# Patient Record
Sex: MALE | Race: WHITE | Employment: PART TIME | URBAN - METROPOLITAN AREA
[De-identification: names, ages, dates, MRNs, and addresses within clinical notes are randomized per-mention and may not be internally consistent; named-entity substitution may affect disease eponyms.]

---

## 2022-07-07 ENCOUNTER — HOSPITAL ENCOUNTER (EMERGENCY)
Age: 77
Discharge: HOME | End: 2022-07-07
Payer: MEDICARE

## 2022-07-07 VITALS
SYSTOLIC BLOOD PRESSURE: 140 MMHG | TEMPERATURE: 98.3 F | DIASTOLIC BLOOD PRESSURE: 68 MMHG | RESPIRATION RATE: 18 BRPM | OXYGEN SATURATION: 97 % | HEART RATE: 69 BPM

## 2022-07-07 DIAGNOSIS — M54.2 NECK PAIN: Primary | ICD-10-CM

## 2022-07-07 PROCEDURE — 6360000002 HC RX W HCPCS: Performed by: PHYSICIAN ASSISTANT

## 2022-07-07 PROCEDURE — 99284 EMERGENCY DEPT VISIT MOD MDM: CPT

## 2022-07-07 PROCEDURE — 96372 THER/PROPH/DIAG INJ SC/IM: CPT

## 2022-07-07 RX ORDER — METHOCARBAMOL 500 MG/1
500 TABLET, FILM COATED ORAL 4 TIMES DAILY
Qty: 40 TABLET | Refills: 0 | Status: SHIPPED | OUTPATIENT
Start: 2022-07-07 | End: 2022-07-17

## 2022-07-07 RX ORDER — LIDOCAINE 50 MG/G
1 PATCH TOPICAL DAILY
Qty: 10 PATCH | Refills: 0 | Status: SHIPPED | OUTPATIENT
Start: 2022-07-07 | End: 2022-07-17

## 2022-07-07 RX ORDER — KETOROLAC TROMETHAMINE 30 MG/ML
15 INJECTION, SOLUTION INTRAMUSCULAR; INTRAVENOUS ONCE
Status: COMPLETED | OUTPATIENT
Start: 2022-07-07 | End: 2022-07-07

## 2022-07-07 RX ADMIN — KETOROLAC TROMETHAMINE 15 MG: 30 INJECTION, SOLUTION INTRAMUSCULAR at 15:34

## 2022-07-07 ASSESSMENT — PAIN SCALES - GENERAL: PAINLEVEL_OUTOF10: 8

## 2022-07-07 ASSESSMENT — PAIN - FUNCTIONAL ASSESSMENT: PAIN_FUNCTIONAL_ASSESSMENT: 0-10

## 2022-07-07 ASSESSMENT — PAIN DESCRIPTION - LOCATION: LOCATION: NECK

## 2022-07-07 NOTE — ED PROVIDER NOTES
[facility] Emergency Department    CHIEF COMPLAINT  Shoulder Pain (began 2 nights ago, unable to sleep pain starts in neck left side radiates down to left arm. ) and Neck Pain      SHARED SERVICE VISIT  Evaluated by LIANG. My supervising physician was available for consultation. HISTORY OF PRESENT ILLNESS  Zelalem Aaron is a 65 y.o. male who presents to the ED complaining of left-sided neck pain. Patient states he been experiencing left-sided neck pain for 2 days. Denies any trauma or mechanism of injury. Patient states that he is experiencing pain with any type of head motion. Patient states pain begins in his neck and radiates to his left shoulder. Patient denies any numbness or tingling radiating down the left arm. Patient denies any weakness of the left arm. Patient denies any chest pain, shortness of breath, or dyspnea on exertion. Patient denies any headaches, dizziness, or loss conscious. Patient denies any nausea, vomiting, diarrhea, or abdominal pain. Patient denies any urinary symptoms. Patient denies any recent travel or sick contacts. No other complaints, modifying factors or associated symptoms. Nursing notes reviewed. History reviewed. No pertinent past medical history. Past Surgical History:   Procedure Laterality Date    BACK SURGERY       History reviewed. No pertinent family history.   Social History     Socioeconomic History    Marital status:      Spouse name: Not on file    Number of children: Not on file    Years of education: Not on file    Highest education level: Not on file   Occupational History    Not on file   Tobacco Use    Smoking status: Never Smoker    Smokeless tobacco: Not on file   Substance and Sexual Activity    Alcohol use: Never    Drug use: Never    Sexual activity: Not on file   Other Topics Concern    Not on file   Social History Narrative    Not on file     Social Determinants of Health     Financial Resource Strain:     Difficulty of Paying Living Expenses: Not on file   Food Insecurity:     Worried About Running Out of Food in the Last Year: Not on file    Jason of Food in the Last Year: Not on file   Transportation Needs:     Lack of Transportation (Medical): Not on file    Lack of Transportation (Non-Medical): Not on file   Physical Activity:     Days of Exercise per Week: Not on file    Minutes of Exercise per Session: Not on file   Stress:     Feeling of Stress : Not on file   Social Connections:     Frequency of Communication with Friends and Family: Not on file    Frequency of Social Gatherings with Friends and Family: Not on file    Attends Advent Services: Not on file    Active Member of Clubs or Organizations: Not on file    Attends Club or Organization Meetings: Not on file    Marital Status: Not on file   Intimate Partner Violence:     Fear of Current or Ex-Partner: Not on file    Emotionally Abused: Not on file    Physically Abused: Not on file    Sexually Abused: Not on file   Housing Stability:     Unable to Pay for Housing in the Last Year: Not on file    Number of [place] in the Last Year: Not on file    Unstable Housing in the Last Year: Not on file     Current Facility-Administered Medications   Medication Dose Route Frequency Provider Last Rate Last Admin    ketorolac (TORADOL) injection 15 mg  15 mg IntraMUSCular Once Gemma Dunn PA-C         No current outpatient medications on file. No Known Allergies    REVIEW OF SYSTEMS  10 systems reviewed, pertinent positives per HPI otherwise noted to be negative    PHYSICAL EXAM  BP (!) 140/68   Pulse 69   Temp 98.3 °F (36.8 °C) (Oral)   Resp 18   SpO2 97%   GENERAL APPEARANCE: Awake and alert. Cooperative. No acute distress. Slight discomfort  HEAD: Normocephalic. Atraumatic. EYES: PERRL. EOM's grossly intact.    ENT: Mucous membranes are pink and moist  NECK: Tenderness palpation along the sternocleidomastoid muscle left-sided. Pain with head motions to the left. No deformities, crepitus, or step-off noted on palpation. No erythema, edema, or ecchymosis. Full range of motion with pain. Supple. HEART: RRR. No murmurs. LUNGS: Respirations unlabored. CTAB. Good air exchange. Speaking comfortably in full sentences. ABDOMEN: Soft. Non-distended. Non-tender. No guarding or rebound. No masses. No organomegaly. EXTREMITIES: No peripheral edema. Moves all extremities equally. All extremities neurovascularly intact. SKIN: Warm and dry. No acute rashes. NEUROLOGICAL: Alert and oriented. CN's 2-12 intact. No gross facial drooping. Strength 5/5, sensation intact. PSYCHIATRIC: Normal mood and affect. RADIOLOGY  No results found. ED COURSE  76-year-old male presents to the ED complaining of left-sided neck pain for the past 2 days. He is experiencing pain with head motions to the left side. Pain does radiate down to his left shoulder, however he denies any numbness or tingling radiating down the arm. Is palpation along the sternocleidomastoid muscle on the left side as well as the trapezius muscle. There is no edema, ecchymosis, erythema. No palpable deformities, crepitus, or step-off. Suspect sternocleidomastoid muscle strain. Patient received Toradol for pain, with good relief. Triage vitals /68, pulse of 69, respirations 18, temperature 98.3 °F, SPO2 97% on room air. Risk management discussed and shared decision making had with patient and/or surrogate. All questions were answered. Patient will follow up with primary care provider for further evaluation/treatment. All questions answered. Patient will return to ED for new/worsening symptoms. Patient was sent home with a prescription for lidocaine patches and Robaxin    CRITICAL CARE TIME  0 minutes of critical care time spent not including separately billable procedures.     MDM  No results found for this visit on 07/07/22. I estimate there is LOW risk for CAUDA EQUINA or CENTRAL CORD SYNDROME, EPIDURAL MASS LESION, MENINGITIS, SPINAL STENOSIS, OR HERNIATED DISK CAUSING SEVERE STENOSIS, thus I consider the discharge disposition reasonable. Ifeoma Morneo and I have discussed the diagnosis and risks, and we agree with discharging home to follow-up with their primary doctor. We also discussed returning to the Emergency Department immediately if new or worsening symptoms occur. We have discussed the symptoms which are most concerning (e.g., saddle anesthesia, urinary or bowel incontinence or retention, changing or worsening pain) that necessitate immediate return. FInal Impression    1. Neck pain        Blood pressure (!) 140/68, pulse 69, temperature 98.3 °F (36.8 °C), temperature source Oral, resp. rate 18, SpO2 97 %. DISPOSITION  Patient was discharged to home in good condition.          Delmi Slaughter PA-C  07/11/22 1950

## 2023-01-17 ENCOUNTER — APPOINTMENT (OUTPATIENT)
Dept: GENERAL RADIOLOGY | Age: 78
End: 2023-01-17
Payer: COMMERCIAL

## 2023-01-17 ENCOUNTER — HOSPITAL ENCOUNTER (EMERGENCY)
Age: 78
Discharge: HOME | End: 2023-01-17
Attending: STUDENT IN AN ORGANIZED HEALTH CARE EDUCATION/TRAINING PROGRAM
Payer: COMMERCIAL

## 2023-01-17 ENCOUNTER — APPOINTMENT (OUTPATIENT)
Dept: CT IMAGING | Age: 78
End: 2023-01-17
Payer: COMMERCIAL

## 2023-01-17 VITALS
DIASTOLIC BLOOD PRESSURE: 84 MMHG | SYSTOLIC BLOOD PRESSURE: 160 MMHG | HEART RATE: 53 BPM | TEMPERATURE: 98 F | RESPIRATION RATE: 16 BRPM | OXYGEN SATURATION: 97 %

## 2023-01-17 DIAGNOSIS — R31.9 HEMATURIA, UNSPECIFIED TYPE: ICD-10-CM
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
## 2023-01-17 DIAGNOSIS — S22.20XA CLOSED FRACTURE OF STERNUM, UNSPECIFIED PORTION OF STERNUM, INITIAL ENCOUNTER: Primary | ICD-10-CM

## 2023-01-17 LAB
A/G RATIO: 1.8 (ref 1.1–2.2)
ALBUMIN SERPL-MCNC: 4.4 G/DL (ref 3.4–5)
ALP BLD-CCNC: 117 U/L (ref 40–129)
ALT SERPL-CCNC: 18 U/L (ref 10–40)
ANION GAP SERPL CALCULATED.3IONS-SCNC: 11 MMOL/L (ref 3–16)
AST SERPL-CCNC: 22 U/L (ref 15–37)
BASOPHILS ABSOLUTE: 0 K/UL (ref 0–0.2)
BASOPHILS RELATIVE PERCENT: 0.9 %
BILIRUB SERPL-MCNC: 0.3 MG/DL (ref 0–1)
BILIRUBIN URINE: NEGATIVE
BLOOD, URINE: ABNORMAL
BUN BLDV-MCNC: 31 MG/DL (ref 7–20)
CALCIUM SERPL-MCNC: 9.3 MG/DL (ref 8.3–10.6)
CHLORIDE BLD-SCNC: 107 MMOL/L (ref 99–110)
CLARITY: ABNORMAL
CO2: 25 MMOL/L (ref 21–32)
COLOR: ABNORMAL
CREAT SERPL-MCNC: 1 MG/DL (ref 0.8–1.3)
EOSINOPHILS ABSOLUTE: 0.1 K/UL (ref 0–0.6)
EOSINOPHILS RELATIVE PERCENT: 1.2 %
GFR SERPL CREATININE-BSD FRML MDRD: >60 ML/MIN/{1.73_M2}
GLUCOSE BLD-MCNC: 128 MG/DL (ref 70–99)
GLUCOSE URINE: NEGATIVE MG/DL
HCT VFR BLD CALC: 34 % (ref 40.5–52.5)
HEMOGLOBIN: 11.3 G/DL (ref 13.5–17.5)
KETONES, URINE: NEGATIVE MG/DL
LACTIC ACID: 1.7 MMOL/L (ref 0.4–2)
LEUKOCYTE ESTERASE, URINE: NEGATIVE
LIPASE: 29 U/L (ref 13–60)
LYMPHOCYTES ABSOLUTE: 1.1 K/UL (ref 1–5.1)
LYMPHOCYTES RELATIVE PERCENT: 21.5 %
MCH RBC QN AUTO: 30.8 PG (ref 26–34)
MCHC RBC AUTO-ENTMCNC: 33.2 G/DL (ref 31–36)
MCV RBC AUTO: 92.8 FL (ref 80–100)
MICROSCOPIC EXAMINATION: YES
MONOCYTES ABSOLUTE: 0.4 K/UL (ref 0–1.3)
MONOCYTES RELATIVE PERCENT: 7.4 %
NEUTROPHILS ABSOLUTE: 3.6 K/UL (ref 1.7–7.7)
NEUTROPHILS RELATIVE PERCENT: 69 %
NITRITE, URINE: NEGATIVE
PDW BLD-RTO: 14.1 % (ref 12.4–15.4)
PH UA: 5.5 (ref 5–8)
PLATELET # BLD: 274 K/UL (ref 135–450)
PMV BLD AUTO: 7 FL (ref 5–10.5)
POTASSIUM SERPL-SCNC: 3.9 MMOL/L (ref 3.5–5.1)
PROTEIN UA: ABNORMAL MG/DL
RBC # BLD: 3.67 M/UL (ref 4.2–5.9)
RBC UA: >100 /HPF (ref 0–4)
SODIUM BLD-SCNC: 143 MMOL/L (ref 136–145)
SPECIFIC GRAVITY UA: 1.02 (ref 1–1.03)
TOTAL CK: 99 U/L (ref 39–308)
TOTAL PROTEIN: 6.8 G/DL (ref 6.4–8.2)
TROPONIN: <0.01 NG/ML
URINE REFLEX TO CULTURE: ABNORMAL
URINE TYPE: ABNORMAL
UROBILINOGEN, URINE: 0.2 E.U./DL
WBC # BLD: 5.1 K/UL (ref 4–11)
WBC UA: ABNORMAL /HPF (ref 0–5)

## 2023-01-17 PROCEDURE — 80053 COMPREHEN METABOLIC PANEL: CPT

## 2023-01-17 PROCEDURE — 73610 X-RAY EXAM OF ANKLE: CPT

## 2023-01-17 PROCEDURE — 73590 X-RAY EXAM OF LOWER LEG: CPT

## 2023-01-17 PROCEDURE — 82550 ASSAY OF CK (CPK): CPT

## 2023-01-17 PROCEDURE — 84484 ASSAY OF TROPONIN QUANT: CPT

## 2023-01-17 PROCEDURE — 81001 URINALYSIS AUTO W/SCOPE: CPT

## 2023-01-17 PROCEDURE — 83605 ASSAY OF LACTIC ACID: CPT

## 2023-01-17 PROCEDURE — 83690 ASSAY OF LIPASE: CPT

## 2023-01-17 PROCEDURE — 93005 ELECTROCARDIOGRAM TRACING: CPT | Performed by: STUDENT IN AN ORGANIZED HEALTH CARE EDUCATION/TRAINING PROGRAM

## 2023-01-17 PROCEDURE — 74177 CT ABD & PELVIS W/CONTRAST: CPT

## 2023-01-17 PROCEDURE — 71260 CT THORAX DX C+: CPT | Performed by: STUDENT IN AN ORGANIZED HEALTH CARE EDUCATION/TRAINING PROGRAM

## 2023-01-17 PROCEDURE — 73080 X-RAY EXAM OF ELBOW: CPT

## 2023-01-17 PROCEDURE — 6360000004 HC RX CONTRAST MEDICATION: Performed by: STUDENT IN AN ORGANIZED HEALTH CARE EDUCATION/TRAINING PROGRAM

## 2023-01-17 PROCEDURE — 85025 COMPLETE CBC W/AUTO DIFF WBC: CPT

## 2023-01-17 PROCEDURE — 6370000000 HC RX 637 (ALT 250 FOR IP): Performed by: STUDENT IN AN ORGANIZED HEALTH CARE EDUCATION/TRAINING PROGRAM

## 2023-01-17 PROCEDURE — 99285 EMERGENCY DEPT VISIT HI MDM: CPT

## 2023-01-17 RX ORDER — DIPHENHYDRAMINE HCL 25 MG
2 CAPSULE ORAL
COMMUNITY

## 2023-01-17 RX ORDER — ACETAMINOPHEN 325 MG/1
650 TABLET ORAL ONCE
Status: COMPLETED | OUTPATIENT
Start: 2023-01-17 | End: 2023-01-17

## 2023-01-17 RX ORDER — TAMSULOSIN HYDROCHLORIDE 0.4 MG/1
CAPSULE ORAL
COMMUNITY
Start: 2021-08-20

## 2023-01-17 RX ORDER — CETIRIZINE HYDROCHLORIDE, PSEUDOEPHEDRINE HYDROCHLORIDE 5; 120 MG/1; MG/1
1 TABLET, FILM COATED, EXTENDED RELEASE ORAL
COMMUNITY

## 2023-01-17 RX ORDER — SENNOSIDES 8.6 MG
650 CAPSULE ORAL
COMMUNITY

## 2023-01-17 RX ORDER — AZELASTINE 1 MG/ML
SPRAY, METERED NASAL
COMMUNITY
Start: 2022-04-13

## 2023-01-17 RX ORDER — CELECOXIB 200 MG/1
CAPSULE ORAL
COMMUNITY

## 2023-01-17 RX ORDER — ATORVASTATIN CALCIUM 20 MG/1
20 TABLET, FILM COATED ORAL DAILY
COMMUNITY
Start: 2022-07-06 | End: 2023-04-01

## 2023-01-17 RX ORDER — CEPHALEXIN 500 MG/1
500 CAPSULE ORAL 4 TIMES DAILY
Qty: 28 CAPSULE | Refills: 0 | Status: SHIPPED | OUTPATIENT
Start: 2023-01-17 | End: 2023-01-24

## 2023-01-17 RX ORDER — CEPHALEXIN 500 MG/1
CAPSULE ORAL
COMMUNITY
Start: 2022-06-23 | End: 2023-01-17

## 2023-01-17 RX ORDER — METHOCARBAMOL 500 MG/1
500 TABLET, FILM COATED ORAL 4 TIMES DAILY
COMMUNITY
Start: 2022-07-07

## 2023-01-17 RX ORDER — METHOCARBAMOL 750 MG/1
1500 TABLET, FILM COATED ORAL ONCE
Status: COMPLETED | OUTPATIENT
Start: 2023-01-17 | End: 2023-01-17

## 2023-01-17 RX ORDER — PREDNISONE 20 MG/1
TABLET ORAL
COMMUNITY
Start: 2022-06-23

## 2023-01-17 RX ADMIN — METHOCARBAMOL TABLETS 1500 MG: 750 TABLET, COATED ORAL at 15:35

## 2023-01-17 RX ADMIN — IOPAMIDOL 75 ML: 755 INJECTION, SOLUTION INTRAVENOUS at 16:35

## 2023-01-17 RX ADMIN — ACETAMINOPHEN 325MG 650 MG: 325 TABLET ORAL at 15:35

## 2023-01-17 ASSESSMENT — PAIN SCALES - GENERAL
PAINLEVEL_OUTOF10: 6
PAINLEVEL_OUTOF10: 2

## 2023-01-17 NOTE — DISCHARGE INSTRUCTIONS
Follow-up with orthopedics as well as urology.:  Set up follow-up appointment soon as able for reevaluation. Begin taking analgesia given to you by primary physician. I would like you to get a repeat CBC and BMP your primary doctor within the next 2 to 3 days return to emergency department for worsening uncontrolled pain, shortness of breath, lightheadedness or dizziness or any new change or worsening symptoms were always here for reevaluation. Your ankle x-ray was concerning for a medial malleolus avulsion fracture we will give you a boot with follow-up with orthopedics. For your blood in your urine return to emergency department for worsening abdominal pain, inability to urinate, fevers or chills, worsening blood in urine or any new changing or worsening symptoms and follow-up with urology as soon as able.
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
## 2023-01-18 LAB
EKG ATRIAL RATE: 65 BPM
EKG DIAGNOSIS: NORMAL
EKG P AXIS: 59 DEGREES
EKG P-R INTERVAL: 146 MS
EKG Q-T INTERVAL: 378 MS
EKG QRS DURATION: 82 MS
EKG QTC CALCULATION (BAZETT): 393 MS
EKG R AXIS: 57 DEGREES
EKG T AXIS: 46 DEGREES
EKG VENTRICULAR RATE: 65 BPM

## 2024-01-13 ENCOUNTER — APPOINTMENT (OUTPATIENT)
Dept: CT IMAGING | Age: 79
DRG: 177 | End: 2024-01-13
Payer: MEDICARE

## 2024-01-13 ENCOUNTER — HOSPITAL ENCOUNTER (INPATIENT)
Age: 79
LOS: 9 days | Discharge: HOME HEALTH | DRG: 177 | End: 2024-01-22
Attending: EMERGENCY MEDICINE | Admitting: STUDENT IN AN ORGANIZED HEALTH CARE EDUCATION/TRAINING PROGRAM
Payer: MEDICARE

## 2024-01-13 ENCOUNTER — APPOINTMENT (OUTPATIENT)
Dept: GENERAL RADIOLOGY | Age: 79
DRG: 177 | End: 2024-01-13
Payer: MEDICARE

## 2024-01-13 DIAGNOSIS — G93.40 ENCEPHALOPATHY: ICD-10-CM

## 2024-01-13 DIAGNOSIS — R41.0 CONFUSION: Primary | ICD-10-CM

## 2024-01-13 PROBLEM — R41.82 ALTERED MENTAL STATE: Status: ACTIVE | Noted: 2024-01-13

## 2024-01-13 LAB
ALBUMIN SERPL-MCNC: 4.3 G/DL (ref 3.4–5)
ALBUMIN/GLOB SERPL: 1.7 {RATIO} (ref 1.1–2.2)
ALP SERPL-CCNC: 75 U/L (ref 40–129)
ALT SERPL-CCNC: 16 U/L (ref 10–40)
ANION GAP SERPL CALCULATED.3IONS-SCNC: 9 MMOL/L (ref 3–16)
AST SERPL-CCNC: 21 U/L (ref 15–37)
BACTERIA URNS QL MICRO: ABNORMAL /HPF
BASOPHILS # BLD: 0 K/UL (ref 0–0.2)
BASOPHILS NFR BLD: 0.8 %
BILIRUB SERPL-MCNC: 0.4 MG/DL (ref 0–1)
BILIRUB UR QL STRIP.AUTO: NEGATIVE
BUN SERPL-MCNC: 25 MG/DL (ref 7–20)
CALCIUM SERPL-MCNC: 9.6 MG/DL (ref 8.3–10.6)
CHLORIDE SERPL-SCNC: 101 MMOL/L (ref 99–110)
CLARITY UR: CLEAR
CO2 SERPL-SCNC: 27 MMOL/L (ref 21–32)
COLOR UR: YELLOW
CREAT SERPL-MCNC: 0.9 MG/DL (ref 0.8–1.3)
DEPRECATED RDW RBC AUTO: 13.2 % (ref 12.4–15.4)
EKG ATRIAL RATE: 70 BPM
EKG DIAGNOSIS: NORMAL
EKG P AXIS: 27 DEGREES
EKG P-R INTERVAL: 132 MS
EKG Q-T INTERVAL: 386 MS
EKG QRS DURATION: 86 MS
EKG QTC CALCULATION (BAZETT): 416 MS
EKG R AXIS: 0 DEGREES
EKG T AXIS: 52 DEGREES
EKG VENTRICULAR RATE: 70 BPM
EOSINOPHIL # BLD: 0 K/UL (ref 0–0.6)
EOSINOPHIL NFR BLD: 0.8 %
EPI CELLS #/AREA URNS HPF: ABNORMAL /HPF (ref 0–5)
GFR SERPLBLD CREATININE-BSD FMLA CKD-EPI: >60 ML/MIN/{1.73_M2}
GLUCOSE SERPL-MCNC: 92 MG/DL (ref 70–99)
GLUCOSE UR STRIP.AUTO-MCNC: NEGATIVE MG/DL
HCT VFR BLD AUTO: 40.3 % (ref 40.5–52.5)
HGB BLD-MCNC: 13.2 G/DL (ref 13.5–17.5)
HGB UR QL STRIP.AUTO: ABNORMAL
KETONES UR STRIP.AUTO-MCNC: NEGATIVE MG/DL
LEUKOCYTE ESTERASE UR QL STRIP.AUTO: NEGATIVE
LYMPHOCYTES # BLD: 0.6 K/UL (ref 1–5.1)
LYMPHOCYTES NFR BLD: 15.8 %
MCH RBC QN AUTO: 31.2 PG (ref 26–34)
MCHC RBC AUTO-ENTMCNC: 32.9 G/DL (ref 31–36)
MCV RBC AUTO: 95.1 FL (ref 80–100)
MONOCYTES # BLD: 0.5 K/UL (ref 0–1.3)
MONOCYTES NFR BLD: 14 %
MUCOUS THREADS #/AREA URNS LPF: ABNORMAL /LPF
NEUTROPHILS # BLD: 2.4 K/UL (ref 1.7–7.7)
NEUTROPHILS NFR BLD: 68.6 %
NITRITE UR QL STRIP.AUTO: NEGATIVE
PH UR STRIP.AUTO: 6 [PH] (ref 5–8)
PLATELET # BLD AUTO: 214 K/UL (ref 135–450)
PMV BLD AUTO: 7.2 FL (ref 5–10.5)
POTASSIUM SERPL-SCNC: 3.9 MMOL/L (ref 3.5–5.1)
PROT SERPL-MCNC: 6.8 G/DL (ref 6.4–8.2)
PROT UR STRIP.AUTO-MCNC: NEGATIVE MG/DL
RBC # BLD AUTO: 4.24 M/UL (ref 4.2–5.9)
RBC #/AREA URNS HPF: ABNORMAL /HPF (ref 0–4)
SODIUM SERPL-SCNC: 137 MMOL/L (ref 136–145)
SP GR UR STRIP.AUTO: >=1.03 (ref 1–1.03)
TROPONIN, HIGH SENSITIVITY: 23 NG/L (ref 0–22)
TROPONIN, HIGH SENSITIVITY: 24 NG/L (ref 0–22)
UA COMPLETE W REFLEX CULTURE PNL UR: ABNORMAL
UA DIPSTICK W REFLEX MICRO PNL UR: YES
URN SPEC COLLECT METH UR: ABNORMAL
UROBILINOGEN UR STRIP-ACNC: 0.2 E.U./DL
WBC # BLD AUTO: 3.5 K/UL (ref 4–11)
WBC #/AREA URNS HPF: ABNORMAL /HPF (ref 0–5)
YEAST URNS QL MICRO: PRESENT /HPF

## 2024-01-13 PROCEDURE — 93005 ELECTROCARDIOGRAM TRACING: CPT | Performed by: PHYSICIAN ASSISTANT

## 2024-01-13 PROCEDURE — 70450 CT HEAD/BRAIN W/O DYE: CPT

## 2024-01-13 PROCEDURE — 1200000000 HC SEMI PRIVATE

## 2024-01-13 PROCEDURE — 85025 COMPLETE CBC W/AUTO DIFF WBC: CPT

## 2024-01-13 PROCEDURE — 80053 COMPREHEN METABOLIC PANEL: CPT

## 2024-01-13 PROCEDURE — 71045 X-RAY EXAM CHEST 1 VIEW: CPT

## 2024-01-13 PROCEDURE — 6370000000 HC RX 637 (ALT 250 FOR IP): Performed by: NURSE PRACTITIONER

## 2024-01-13 PROCEDURE — 81001 URINALYSIS AUTO W/SCOPE: CPT

## 2024-01-13 PROCEDURE — 72125 CT NECK SPINE W/O DYE: CPT

## 2024-01-13 PROCEDURE — 84443 ASSAY THYROID STIM HORMONE: CPT

## 2024-01-13 PROCEDURE — 84484 ASSAY OF TROPONIN QUANT: CPT

## 2024-01-13 PROCEDURE — 2580000003 HC RX 258: Performed by: NURSE PRACTITIONER

## 2024-01-13 PROCEDURE — 93010 ELECTROCARDIOGRAM REPORT: CPT | Performed by: INTERNAL MEDICINE

## 2024-01-13 PROCEDURE — 6370000000 HC RX 637 (ALT 250 FOR IP): Performed by: PHYSICIAN ASSISTANT

## 2024-01-13 PROCEDURE — 99285 EMERGENCY DEPT VISIT HI MDM: CPT

## 2024-01-13 RX ORDER — SODIUM CHLORIDE 0.9 % (FLUSH) 0.9 %
5-40 SYRINGE (ML) INJECTION PRN
Status: DISCONTINUED | OUTPATIENT
Start: 2024-01-13 | End: 2024-01-22 | Stop reason: HOSPADM

## 2024-01-13 RX ORDER — ACETAMINOPHEN 325 MG/1
650 TABLET ORAL EVERY 6 HOURS PRN
Status: DISCONTINUED | OUTPATIENT
Start: 2024-01-13 | End: 2024-01-22 | Stop reason: HOSPADM

## 2024-01-13 RX ORDER — POTASSIUM CHLORIDE 7.45 MG/ML
10 INJECTION INTRAVENOUS PRN
Status: DISCONTINUED | OUTPATIENT
Start: 2024-01-13 | End: 2024-01-22 | Stop reason: HOSPADM

## 2024-01-13 RX ORDER — SODIUM CHLORIDE 9 MG/ML
INJECTION, SOLUTION INTRAVENOUS PRN
Status: DISCONTINUED | OUTPATIENT
Start: 2024-01-13 | End: 2024-01-22 | Stop reason: HOSPADM

## 2024-01-13 RX ORDER — POTASSIUM CHLORIDE 20 MEQ/1
40 TABLET, EXTENDED RELEASE ORAL PRN
Status: DISCONTINUED | OUTPATIENT
Start: 2024-01-13 | End: 2024-01-22 | Stop reason: HOSPADM

## 2024-01-13 RX ORDER — FLUCONAZOLE 100 MG/1
200 TABLET ORAL NIGHTLY
Status: DISCONTINUED | OUTPATIENT
Start: 2024-01-13 | End: 2024-01-14

## 2024-01-13 RX ORDER — SODIUM CHLORIDE 0.9 % (FLUSH) 0.9 %
5-40 SYRINGE (ML) INJECTION EVERY 12 HOURS SCHEDULED
Status: DISCONTINUED | OUTPATIENT
Start: 2024-01-13 | End: 2024-01-22 | Stop reason: HOSPADM

## 2024-01-13 RX ORDER — POLYETHYLENE GLYCOL 3350 17 G/17G
17 POWDER, FOR SOLUTION ORAL DAILY PRN
Status: DISCONTINUED | OUTPATIENT
Start: 2024-01-13 | End: 2024-01-22 | Stop reason: HOSPADM

## 2024-01-13 RX ORDER — ATORVASTATIN CALCIUM 20 MG/1
20 TABLET, FILM COATED ORAL DAILY
Status: ON HOLD | COMMUNITY
End: 2024-01-14 | Stop reason: HOSPADM

## 2024-01-13 RX ORDER — SODIUM CHLORIDE 9 MG/ML
INJECTION, SOLUTION INTRAVENOUS CONTINUOUS
Status: DISCONTINUED | OUTPATIENT
Start: 2024-01-13 | End: 2024-01-14

## 2024-01-13 RX ORDER — ENOXAPARIN SODIUM 100 MG/ML
40 INJECTION SUBCUTANEOUS DAILY
Status: DISCONTINUED | OUTPATIENT
Start: 2024-01-14 | End: 2024-01-13

## 2024-01-13 RX ORDER — FLUCONAZOLE 2 MG/ML
200 INJECTION, SOLUTION INTRAVENOUS EVERY 24 HOURS
Status: DISCONTINUED | OUTPATIENT
Start: 2024-01-13 | End: 2024-01-13

## 2024-01-13 RX ORDER — TRAZODONE HYDROCHLORIDE 50 MG/1
50 TABLET ORAL ONCE
Status: COMPLETED | OUTPATIENT
Start: 2024-01-13 | End: 2024-01-13

## 2024-01-13 RX ORDER — ACETAMINOPHEN 650 MG/1
650 SUPPOSITORY RECTAL EVERY 6 HOURS PRN
Status: DISCONTINUED | OUTPATIENT
Start: 2024-01-13 | End: 2024-01-22 | Stop reason: HOSPADM

## 2024-01-13 RX ORDER — ONDANSETRON 2 MG/ML
4 INJECTION INTRAMUSCULAR; INTRAVENOUS EVERY 6 HOURS PRN
Status: DISCONTINUED | OUTPATIENT
Start: 2024-01-13 | End: 2024-01-22 | Stop reason: HOSPADM

## 2024-01-13 RX ORDER — ONDANSETRON 4 MG/1
4 TABLET, ORALLY DISINTEGRATING ORAL EVERY 8 HOURS PRN
Status: DISCONTINUED | OUTPATIENT
Start: 2024-01-13 | End: 2024-01-22 | Stop reason: HOSPADM

## 2024-01-13 RX ORDER — MAGNESIUM SULFATE IN WATER 40 MG/ML
2000 INJECTION, SOLUTION INTRAVENOUS PRN
Status: DISCONTINUED | OUTPATIENT
Start: 2024-01-13 | End: 2024-01-22 | Stop reason: HOSPADM

## 2024-01-13 RX ORDER — ACETAMINOPHEN 500 MG
1000 TABLET ORAL ONCE
Status: COMPLETED | OUTPATIENT
Start: 2024-01-13 | End: 2024-01-13

## 2024-01-13 RX ADMIN — ACETAMINOPHEN 650 MG: 325 TABLET ORAL at 22:57

## 2024-01-13 RX ADMIN — SODIUM CHLORIDE, PRESERVATIVE FREE 10 ML: 5 INJECTION INTRAVENOUS at 23:00

## 2024-01-13 RX ADMIN — TRAZODONE HYDROCHLORIDE 50 MG: 50 TABLET ORAL at 22:57

## 2024-01-13 RX ADMIN — FLUCONAZOLE 200 MG: 100 TABLET ORAL at 22:57

## 2024-01-13 RX ADMIN — SODIUM CHLORIDE: 9 INJECTION, SOLUTION INTRAVENOUS at 22:57

## 2024-01-13 RX ADMIN — ACETAMINOPHEN 1000 MG: 500 TABLET ORAL at 17:00

## 2024-01-13 ASSESSMENT — PAIN DESCRIPTION - DESCRIPTORS
DESCRIPTORS: DISCOMFORT;ACHING
DESCRIPTORS: ACHING

## 2024-01-13 ASSESSMENT — PAIN SCALES - GENERAL
PAINLEVEL_OUTOF10: 4
PAINLEVEL_OUTOF10: 4
PAINLEVEL_OUTOF10: 0

## 2024-01-13 ASSESSMENT — PAIN DESCRIPTION - LOCATION
LOCATION: HEAD
LOCATION: BACK

## 2024-01-13 NOTE — ED PROVIDER NOTES
[facility] C[facility] TELE/MED SURG/ONC  EMERGENCY DEPARTMENT ENCOUNTER        Pt Name: Zaid Ortega  MRN: [id number]  Birthdate 1945  Date of evaluation: 1/13/2024  Provider: JAYSON Phelps  PCP: Galina Rasmussen APRN - CNP  Note Started: 4:28 PM EST        I have seen and evaluated this patient with my supervising physician MD Dayami.    CHIEF COMPLAINT       Chief Complaint   Patient presents with    Altered Mental Status     Pt to ED for increased confusion, per wife pt is looking for something\" but known what he is looking for. Pt has dementia.        HISTORY OF PRESENT ILLNESS      Chief Complaint: Confusion    Zaid Ortega is a 78 y.o. male who presents to the emergency room for evaluation of confusion.  Patient woke in the middle the night last night, became obsessed with finding something.  He fails to return to bed and was instead hunting the house.  Has a history of dementia, but has been acting normally other than some memory problems.  Denies the first night he is acting abnormally like this.    In the emergency room today he reports no pain.  No headache.  They report falls over the past few weeks, nothing acute.    SCREENINGS    Dav Coma Scale  Eye Opening: Spontaneous  Best Verbal Response: Oriented  Best Motor Response: Obeys commands  Dav Coma Scale Score: 15      Is this patient to be included in the SEP-1 Core Measure due to severe sepsis or septic shock?   No   Exclusion criteria - the patient is NOT to be included for SEP-1 Core Measure due to:  Infection is not suspected      PHYSICAL EXAM     Vitals: BP (!) 163/80   Pulse 63   Temp 98.3 °F (36.8 °C) (Oral)   Resp 18   Ht 1.727 m (5' 8\")   Wt 81 kg (178 lb 9.2 oz)   SpO2 96%   BMI 27.15 kg/m²    General: awake, alert, no apparent distress  Pupils: equal, reactive  Head: Non-traumatic  Heart: Rate as noted, regular rhythm, no murmur or rubs.  Chest/Lungs: CTAB, no wheezes or crackles  Abdomen: soft, nondistended, no

## 2024-01-14 ENCOUNTER — APPOINTMENT (OUTPATIENT)
Dept: CT IMAGING | Age: 79
DRG: 177 | End: 2024-01-14
Payer: MEDICARE

## 2024-01-14 ENCOUNTER — APPOINTMENT (OUTPATIENT)
Dept: GENERAL RADIOLOGY | Age: 79
DRG: 177 | End: 2024-01-14
Payer: MEDICARE

## 2024-01-14 LAB
ANION GAP SERPL CALCULATED.3IONS-SCNC: 10 MMOL/L (ref 3–16)
BASOPHILS # BLD: 0 K/UL (ref 0–0.2)
BASOPHILS NFR BLD: 0.7 %
BUN SERPL-MCNC: 19 MG/DL (ref 7–20)
CALCIUM SERPL-MCNC: 9.2 MG/DL (ref 8.3–10.6)
CHLORIDE SERPL-SCNC: 102 MMOL/L (ref 99–110)
CO2 SERPL-SCNC: 24 MMOL/L (ref 21–32)
CREAT SERPL-MCNC: 0.8 MG/DL (ref 0.8–1.3)
DEPRECATED RDW RBC AUTO: 13.1 % (ref 12.4–15.4)
EOSINOPHIL # BLD: 0 K/UL (ref 0–0.6)
EOSINOPHIL NFR BLD: 0.7 %
FLUAV RNA RESP QL NAA+PROBE: NOT DETECTED
FLUBV RNA RESP QL NAA+PROBE: NOT DETECTED
GFR SERPLBLD CREATININE-BSD FMLA CKD-EPI: >60 ML/MIN/{1.73_M2}
GLUCOSE SERPL-MCNC: 107 MG/DL (ref 70–99)
HCT VFR BLD AUTO: 38.6 % (ref 40.5–52.5)
HGB BLD-MCNC: 12.8 G/DL (ref 13.5–17.5)
LYMPHOCYTES # BLD: 0.6 K/UL (ref 1–5.1)
LYMPHOCYTES NFR BLD: 16.3 %
MCH RBC QN AUTO: 31.2 PG (ref 26–34)
MCHC RBC AUTO-ENTMCNC: 33.2 G/DL (ref 31–36)
MCV RBC AUTO: 94 FL (ref 80–100)
MONOCYTES # BLD: 0.5 K/UL (ref 0–1.3)
MONOCYTES NFR BLD: 14.5 %
NEUTROPHILS # BLD: 2.4 K/UL (ref 1.7–7.7)
NEUTROPHILS NFR BLD: 67.8 %
PLATELET # BLD AUTO: 195 K/UL (ref 135–450)
PMV BLD AUTO: 7.5 FL (ref 5–10.5)
POTASSIUM SERPL-SCNC: 3.8 MMOL/L (ref 3.5–5.1)
PROCALCITONIN SERPL IA-MCNC: 0.1 NG/ML (ref 0–0.15)
RBC # BLD AUTO: 4.1 M/UL (ref 4.2–5.9)
RSV AG NOSE QL: NEGATIVE
SARS-COV-2 RNA RESP QL NAA+PROBE: DETECTED
SODIUM SERPL-SCNC: 136 MMOL/L (ref 136–145)
WBC # BLD AUTO: 3.6 K/UL (ref 4–11)

## 2024-01-14 PROCEDURE — 83036 HEMOGLOBIN GLYCOSYLATED A1C: CPT

## 2024-01-14 PROCEDURE — 71045 X-RAY EXAM CHEST 1 VIEW: CPT

## 2024-01-14 PROCEDURE — 6370000000 HC RX 637 (ALT 250 FOR IP): Performed by: NURSE PRACTITIONER

## 2024-01-14 PROCEDURE — 97166 OT EVAL MOD COMPLEX 45 MIN: CPT

## 2024-01-14 PROCEDURE — 70450 CT HEAD/BRAIN W/O DYE: CPT

## 2024-01-14 PROCEDURE — 6370000000 HC RX 637 (ALT 250 FOR IP): Performed by: INTERNAL MEDICINE

## 2024-01-14 PROCEDURE — 84145 PROCALCITONIN (PCT): CPT

## 2024-01-14 PROCEDURE — 85025 COMPLETE CBC W/AUTO DIFF WBC: CPT

## 2024-01-14 PROCEDURE — 2580000003 HC RX 258: Performed by: NURSE PRACTITIONER

## 2024-01-14 PROCEDURE — 97535 SELF CARE MNGMENT TRAINING: CPT

## 2024-01-14 PROCEDURE — 51798 US URINE CAPACITY MEASURE: CPT

## 2024-01-14 PROCEDURE — 1200000000 HC SEMI PRIVATE

## 2024-01-14 PROCEDURE — 87807 RSV ASSAY W/OPTIC: CPT

## 2024-01-14 PROCEDURE — 87636 SARSCOV2 & INF A&B AMP PRB: CPT

## 2024-01-14 PROCEDURE — 80048 BASIC METABOLIC PNL TOTAL CA: CPT

## 2024-01-14 RX ORDER — QUETIAPINE FUMARATE 25 MG/1
25 TABLET, FILM COATED ORAL NIGHTLY PRN
Status: DISCONTINUED | OUTPATIENT
Start: 2024-01-14 | End: 2024-01-22 | Stop reason: HOSPADM

## 2024-01-14 RX ORDER — TAMSULOSIN HYDROCHLORIDE 0.4 MG/1
0.4 CAPSULE ORAL DAILY
Status: DISCONTINUED | OUTPATIENT
Start: 2024-01-14 | End: 2024-01-22 | Stop reason: HOSPADM

## 2024-01-14 RX ORDER — QUETIAPINE FUMARATE 25 MG/1
25 TABLET, FILM COATED ORAL NIGHTLY PRN
Qty: 60 TABLET | Refills: 3 | Status: SHIPPED | OUTPATIENT
Start: 2024-01-14 | End: 2024-01-22 | Stop reason: HOSPADM

## 2024-01-14 RX ORDER — QUETIAPINE FUMARATE 25 MG/1
25 TABLET, FILM COATED ORAL ONCE
Status: COMPLETED | OUTPATIENT
Start: 2024-01-14 | End: 2024-01-14

## 2024-01-14 RX ADMIN — ACETAMINOPHEN 650 MG: 325 TABLET ORAL at 04:33

## 2024-01-14 RX ADMIN — SODIUM CHLORIDE, PRESERVATIVE FREE 10 ML: 5 INJECTION INTRAVENOUS at 08:18

## 2024-01-14 RX ADMIN — QUETIAPINE FUMARATE 25 MG: 25 TABLET ORAL at 14:44

## 2024-01-14 RX ADMIN — TAMSULOSIN HYDROCHLORIDE 0.4 MG: 0.4 CAPSULE ORAL at 14:44

## 2024-01-14 RX ADMIN — SODIUM CHLORIDE, PRESERVATIVE FREE 10 ML: 5 INJECTION INTRAVENOUS at 20:15

## 2024-01-14 RX ADMIN — Medication 10 ML: at 20:16

## 2024-01-14 RX ADMIN — ACETAMINOPHEN 650 MG: 325 TABLET ORAL at 23:35

## 2024-01-14 ASSESSMENT — PAIN SCALES - GENERAL
PAINLEVEL_OUTOF10: 0
PAINLEVEL_OUTOF10: 4

## 2024-01-14 ASSESSMENT — PAIN DESCRIPTION - LOCATION: LOCATION: HEAD

## 2024-01-14 ASSESSMENT — PAIN DESCRIPTION - DESCRIPTORS: DESCRIPTORS: ACHING

## 2024-01-14 NOTE — ED NOTES
Mr. Ortega is a 78 y.o. male who had concerns including Altered Mental Status (Pt to ED for increased confusion, per wife pt is looking for something\" but known what he is looking for. Pt has dementia. ).    Chief Complaint   Patient presents with    Altered Mental Status     Pt to ED for increased confusion, per wife pt is looking for something\" but known what he is looking for. Pt has dementia.        He is being admitted for:    Altered mental state    His ED problem list included:    1. Confusion    2. Encephalopathy        Past Medical History:   Diagnosis Date    Dementia (HCC)     Hyperlipidemia     Iron (Fe) deficiency anemia     MGUS (monoclonal gammopathy of unknown significance)        Past Surgical History:   Procedure Laterality Date    BACK SURGERY         His recent abnormal labs were:    Labs Reviewed   URINALYSIS WITH REFLEX TO CULTURE - Abnormal; Notable for the following components:       Result Value    Blood, Urine LARGE (*)     All other components within normal limits   CBC WITH AUTO DIFFERENTIAL - Abnormal; Notable for the following components:    WBC 3.5 (*)     Hemoglobin 13.2 (*)     Hematocrit 40.3 (*)     Lymphocytes Absolute 0.6 (*)     All other components within normal limits   COMPREHENSIVE METABOLIC PANEL - Abnormal; Notable for the following components:    BUN 25 (*)     All other components within normal limits   TROPONIN - Abnormal; Notable for the following components:    Troponin, High Sensitivity 23 (*)     All other components within normal limits   TROPONIN - Abnormal; Notable for the following components:    Troponin, High Sensitivity 24 (*)     All other components within normal limits   MICROSCOPIC URINALYSIS - Abnormal; Notable for the following components:    Mucus, UA 1+ (*)     RBC, UA 5-10 (*)     Bacteria, UA Rare (*)     Yeast, UA Present (*)     All other components within normal limits   TSH WITH REFLEX TO FT4   HEMOGLOBIN A1C       His vital signs for the

## 2024-01-14 NOTE — DISCHARGE INSTR - COC
Continuity of Care Form    Patient Name: Zaid Ortega   :  1945  MRN:  [id number]    Admit date:  2024  Discharge date:  2024    Code Status Order: Full Code   Advance Directives:     Admitting Physician:  Jacqueline Collins DO  PCP: Galina Rasmussen APRN - CNP    Discharging Nurse: JAMA Vela  Discharging Hospital Unit/Room#: 0339/0339-01  Discharging Unit Phone Number: [phone]    Emergency Contact:   Extended Emergency Contact Information  Primary Emergency Contact: Cari Ortega  Home Phone: [phone]  Relation: Spouse    Past Surgical History:  Past Surgical History:   Procedure Laterality Date    BACK SURGERY         Immunization History:   Immunization History   Administered Date(s) Administered    COVID-19, PFIZER PURPLE top, DILUTE for use, (age 12 y+), 30mcg/0.3mL 2021, 2021, 10/22/2021       Active Problems:  Patient Active Problem List   Diagnosis Code    Altered mental state R41.82       Isolation/Infection:   Isolation            No Isolation          Patient Infection Status       None to display            Nurse Assessment:  Last Vital Signs: BP (!) 163/80   Pulse 63   Temp 98.3 °F (36.8 °C) (Oral)   Resp 18   Ht 1.727 m (5' 8\")   Wt 81 kg (178 lb 9.2 oz)   SpO2 96%   BMI 27.15 kg/m²     Last documented pain score (0-10 scale): Pain Level: 4  Last Weight:   Wt Readings from Last 1 Encounters:   24 81 kg (178 lb 9.2 oz)     Mental Status:  oriented, alert, and disoriented at times, can concentrate & follow directions    IV Access:  - None    Nursing Mobility/ADLs:  Walking   Assisted  Transfer  Assisted  Bathing  Assisted  Dressing  Independent  Toileting  Assisted  Feeding  Assisted - Set up   Med Admin  Assisted  Med Delivery   whole    Wound Care Documentation and Therapy:        Elimination:  Continence:   Bowel: Yes  Bladder: Yes  Urinary Catheter: None   Colostomy/Ileostomy/Ileal Conduit: No       Date of Last BM: 2024  No intake or output data in

## 2024-01-14 NOTE — PLAN OF CARE
Problem: Safety - Adult  Goal: Free from fall injury  1/14/2024 0953 by Maci Bowen RN  Outcome: Progressing  Flowsheets (Taken 1/14/2024 0953)  Free From Fall Injury: Based on caregiver fall risk screen, instruct family/caregiver to ask for assistance with transferring infant if caregiver noted to have fall risk factors  1/14/2024 0118 by Magdaleno Briggs, RN  Outcome: Progressing     Problem: Pain  Goal: Verbalizes/displays adequate comfort level or baseline comfort level  1/14/2024 0953 by Maci Bowen RN  Outcome: Progressing  Flowsheets (Taken 1/14/2024 0118 by Magdaleno Briggs, JAMA)  Verbalizes/displays adequate comfort level or baseline comfort level:   Encourage patient to monitor pain and request assistance   Assess pain using appropriate pain scale   Administer analgesics based on type and severity of pain and evaluate response   Implement non-pharmacological measures as appropriate and evaluate response

## 2024-01-14 NOTE — CONSULTS
Consult Placed     Who: psychiatry  Date:1-13-24  Time:10:34     Electronically signed by Ara Rolon on 1/13/2024 at 10:33 PM

## 2024-01-14 NOTE — ED PROVIDER NOTES
Emergency Department Attending Provider Note  Location: [facility]  ED  1/13/2024     Patient Identification  Zaid Ortega is a 78 y.o. male      Zaid Ortega was evaluated in the Emergency Department for AMS, PMH dementia, but drastic chg in behavior today. Although initial history and physical exam information was obtained by LIANG/NPP/MD/ (who also dictated a record of this visit), I personally saw the patient and performed a substantive portion of the visit including all aspects of the medical decision making.        Patient seen and evaluated.  Relevant records reviewed.  Patient's care signed out to me from LIANG with pending CT imaging of the head and C-spine.  CT imaging shows no evidence of acute process.  I reassessed the patient and wife is now at bedside and wife is concerned because he is still significantly more confused than baseline.  During conversation patient has nonsensical speech intermittently which wife reports is abnormal.  He has had this mildly in the past but is substantially worse.  Reexamination he has no focal deficits on neuroexam otherwise.  Possibly acute on chronic presentation of worsening dementia versus new pathology.  Given he has ongoing altered mental status I do feel he should be admitted for further workup.      I, Dr. Stock, am the primary clinician of record.   I personally saw the patient and independently provided 0 minutes of non-concurrent critical care out of the total shared critical care time provided.    This chart was generated in part by using Dragon Dictation system and may contain errors related to that system including errors in grammar, punctuation, and spelling, as well as words and phrases that may be inappropriate. If there are any questions or concerns please feel free to contact the dictating provider for clarification.     Javier Stock MD   [employer]       Javier Stock MD  01/13/24 1950

## 2024-01-14 NOTE — CARE COORDINATION
CASE MANAGEMENT DISCHARGE SUMMARY      Discharge to: home with [facility]/referral made to [facility]    New Durable Medical Equipment ordered/agency: na    Transportation:    Family/car: wife     Confirmed discharge plan with:RN, MAYKEL, spoke with wife-krunal over the phone     Patient: yes/no     Family:  yes/no    Name: Contact number:     Facility/Agency, name:  [facility] faxed Malaika to pull orders   Phone number for report to facility:      RN, name: Maci    Note: Discharging nurse to complete MATTIE, reconcile [facility]S, and place final copy with patient's discharge packet. RN to ensure that written prescriptions for  Level II medications are sent with patient to the facility as per protocol.

## 2024-01-14 NOTE — PLAN OF CARE
Problem: Pain  Goal: Verbalizes/displays adequate comfort level or baseline comfort level  Outcome: Progressing  Flowsheets (Taken 1/14/2024 0118)  Verbalizes/displays adequate comfort level or baseline comfort level:   Encourage patient to monitor pain and request assistance   Assess pain using appropriate pain scale   Administer analgesics based on type and severity of pain and evaluate response   Implement non-pharmacological measures as appropriate and evaluate response     Problem: Safety - Adult  Goal: Free from fall injury  Outcome: Progressing

## 2024-01-14 NOTE — ED NOTES
Mr. Ortega is a 78 y.o. male who had concerns including Altered Mental Status (Pt to ED for increased confusion, per wife pt is looking for something\" but known what he is looking for. Pt has dementia. ).    Chief Complaint   Patient presents with    Altered Mental Status     Pt to ED for increased confusion, per wife pt is looking for something\" but known what he is looking for. Pt has dementia.        He is being admitted for:    Altered mental state    His ED problem list included:    1. Confusion    2. Encephalopathy        Past Medical History:   Diagnosis Date    Dementia (HCC)     Hyperlipidemia        Past Surgical History:   Procedure Laterality Date    BACK SURGERY         His recent abnormal labs were:    Labs Reviewed   URINALYSIS WITH REFLEX TO CULTURE - Abnormal; Notable for the following components:       Result Value    Blood, Urine LARGE (*)     All other components within normal limits   CBC WITH AUTO DIFFERENTIAL - Abnormal; Notable for the following components:    WBC 3.5 (*)     Hemoglobin 13.2 (*)     Hematocrit 40.3 (*)     Lymphocytes Absolute 0.6 (*)     All other components within normal limits   COMPREHENSIVE METABOLIC PANEL - Abnormal; Notable for the following components:    BUN 25 (*)     All other components within normal limits   TROPONIN - Abnormal; Notable for the following components:    Troponin, High Sensitivity 23 (*)     All other components within normal limits   TROPONIN - Abnormal; Notable for the following components:    Troponin, High Sensitivity 24 (*)     All other components within normal limits   MICROSCOPIC URINALYSIS - Abnormal; Notable for the following components:    Mucus, UA 1+ (*)     RBC, UA 5-10 (*)     Bacteria, UA Rare (*)     Yeast, UA Present (*)     All other components within normal limits   TSH WITH REFLEX TO FT4       His vital signs for the encounter were:    Patient Vitals for the past 24 hrs:   BP Temp Temp src Pulse Resp SpO2   01/13/24 1905 (!)

## 2024-01-14 NOTE — H&P
[facility] History & Physical        Date of Service: 01/13/2024    Time of Service: 2010    Disposition:    [x]Admitted to inpatient status with expected LOS greater than two midnights due to medical therapy.  []Placed in observation status.    Historian: Self/patient, wife at bedside, chart review, Care Everywhere, ED documentation    Chief Admission Complaint:  Confusion overnight last night    Presenting Admission History:      Zaid Ortega is a/an 78 y.o. male with a significant past medical history of MGUS, BPH, iron deficient anemia, chronic back pain status post prior lumbar surgery who presents to [facility]'s emergency department with wife at bedside who notes that since the patient exhibited unusual behavior last night.  Wife notes that he has been doing what sounds like fantasy football, picking football players with groups of friends, became fixated on this overnight last night which is unusual for him.  Patient's wife notes that she was awakened around 2 or 3 in the morning, patient was rummaging through the house and when she asked what he was looking for, he responded \"I do not know but I need to find it\".  This continued until the morning which she notes he seemed to normalize around breakfast time and seems to be back to normal now.  She does admit that he has had memory issues over the last couple years, her and the patient endorsed that they have an appointment with [facility] in February.  Patient admits to being able to recall this incident, states feels that he is mentally intact at this time.  There have been no other recent symptoms or injuries leading up to today.  His evaluation emergency form included laboratory studies, EKG, chest x-ray, CT of the head, CT C-spine.  Imaging was negative for any acute findings; CT C-spine did show multilevel degenerative changes at C5/C6.  Laboratory studies were unremarkable, mild elevation of troponin at 23 with

## 2024-01-14 NOTE — DISCHARGE SUMMARY
01/13/2024 03:17 PM    WBCUA 0-2 01/13/2024 03:17 PM    RBCUA 5-10 01/13/2024 03:17 PM    MUCUS 1+ 01/13/2024 03:17 PM    YEAST Present 01/13/2024 03:17 PM    BACTERIA Rare 01/13/2024 03:17 PM    CLARITYU Clear 01/13/2024 03:17 PM    SPECGRAV >=1.030 01/13/2024 03:17 PM    LEUKOCYTESUR Negative 01/13/2024 03:17 PM    UROBILINOGEN 0.2 01/13/2024 03:17 PM    BILIRUBINUR Negative 01/13/2024 03:17 PM    BLOODU LARGE 01/13/2024 03:17 PM    GLUCOSEU Negative 01/13/2024 03:17 PM    KETUA Negative 01/13/2024 03:17 PM     Urine Cultures: No results found for: \"LABURIN\"  Blood Cultures: No results found for: \"BC\"  No results found for: \"BLOODCULT2\"  Organism: No results found for: \"ORG\"      The patient expressed appropriate understanding of, and agreement with the discharge recommendations, medications, and plan.     Discharge condition: stable    Consults this admission:  None    Time Spent Discharging patient 33 minutes    Electronically signed by JET WALTER MD on 1/14/2024 at 8:10 AM

## 2024-01-15 ENCOUNTER — APPOINTMENT (OUTPATIENT)
Dept: MRI IMAGING | Age: 79
DRG: 177 | End: 2024-01-15
Payer: MEDICARE

## 2024-01-15 LAB
EST. AVERAGE GLUCOSE BLD GHB EST-MCNC: 111.2 MG/DL
HBA1C MFR BLD: 5.5 %

## 2024-01-15 PROCEDURE — 6370000000 HC RX 637 (ALT 250 FOR IP): Performed by: INTERNAL MEDICINE

## 2024-01-15 PROCEDURE — 2700000000 HC OXYGEN THERAPY PER DAY

## 2024-01-15 PROCEDURE — 6370000000 HC RX 637 (ALT 250 FOR IP): Performed by: NURSE PRACTITIONER

## 2024-01-15 PROCEDURE — 92610 EVALUATE SWALLOWING FUNCTION: CPT

## 2024-01-15 PROCEDURE — 6360000002 HC RX W HCPCS

## 2024-01-15 PROCEDURE — 97162 PT EVAL MOD COMPLEX 30 MIN: CPT

## 2024-01-15 PROCEDURE — 97530 THERAPEUTIC ACTIVITIES: CPT

## 2024-01-15 PROCEDURE — 6360000002 HC RX W HCPCS: Performed by: INTERNAL MEDICINE

## 2024-01-15 PROCEDURE — 70553 MRI BRAIN STEM W/O & W/DYE: CPT

## 2024-01-15 PROCEDURE — 6360000004 HC RX CONTRAST MEDICATION: Performed by: INTERNAL MEDICINE

## 2024-01-15 PROCEDURE — 1200000000 HC SEMI PRIVATE

## 2024-01-15 PROCEDURE — A9579 GAD-BASE MR CONTRAST NOS,1ML: HCPCS | Performed by: INTERNAL MEDICINE

## 2024-01-15 PROCEDURE — 2580000003 HC RX 258: Performed by: NURSE PRACTITIONER

## 2024-01-15 PROCEDURE — 92523 SPEECH SOUND LANG COMPREHEN: CPT

## 2024-01-15 PROCEDURE — 94761 N-INVAS EAR/PLS OXIMETRY MLT: CPT

## 2024-01-15 PROCEDURE — 97110 THERAPEUTIC EXERCISES: CPT

## 2024-01-15 PROCEDURE — 51798 US URINE CAPACITY MEASURE: CPT

## 2024-01-15 RX ORDER — ENOXAPARIN SODIUM 100 MG/ML
40 INJECTION SUBCUTANEOUS DAILY
Status: DISCONTINUED | OUTPATIENT
Start: 2024-01-15 | End: 2024-01-22 | Stop reason: HOSPADM

## 2024-01-15 RX ORDER — GUAIFENESIN/DEXTROMETHORPHAN 100-10MG/5
5 SYRUP ORAL EVERY 4 HOURS PRN
Status: DISCONTINUED | OUTPATIENT
Start: 2024-01-15 | End: 2024-01-22 | Stop reason: HOSPADM

## 2024-01-15 RX ORDER — DIPHENHYDRAMINE HYDROCHLORIDE 50 MG/ML
INJECTION INTRAMUSCULAR; INTRAVENOUS
Status: COMPLETED
Start: 2024-01-15 | End: 2024-01-15

## 2024-01-15 RX ORDER — DEXAMETHASONE 4 MG/1
6 TABLET ORAL DAILY
Status: DISCONTINUED | OUTPATIENT
Start: 2024-01-15 | End: 2024-01-17

## 2024-01-15 RX ORDER — DIPHENHYDRAMINE HYDROCHLORIDE 50 MG/ML
12.5 INJECTION INTRAMUSCULAR; INTRAVENOUS EVERY 6 HOURS PRN
Status: DISCONTINUED | OUTPATIENT
Start: 2024-01-15 | End: 2024-01-22 | Stop reason: HOSPADM

## 2024-01-15 RX ADMIN — ENOXAPARIN SODIUM 40 MG: 100 INJECTION SUBCUTANEOUS at 12:23

## 2024-01-15 RX ADMIN — DIPHENHYDRAMINE HYDROCHLORIDE 12.5 MG: 50 INJECTION INTRAMUSCULAR; INTRAVENOUS at 05:13

## 2024-01-15 RX ADMIN — GADOTERIDOL 16 ML: 279.3 INJECTION, SOLUTION INTRAVENOUS at 11:59

## 2024-01-15 RX ADMIN — SODIUM CHLORIDE, PRESERVATIVE FREE 10 ML: 5 INJECTION INTRAVENOUS at 22:26

## 2024-01-15 RX ADMIN — TAMSULOSIN HYDROCHLORIDE 0.4 MG: 0.4 CAPSULE ORAL at 10:31

## 2024-01-15 RX ADMIN — SODIUM CHLORIDE, PRESERVATIVE FREE 10 ML: 5 INJECTION INTRAVENOUS at 12:23

## 2024-01-15 RX ADMIN — GUAIFENESIN AND DEXTROMETHORPHAN 5 ML: 100; 10 SYRUP ORAL at 22:41

## 2024-01-15 RX ADMIN — DEXAMETHASONE 6 MG: 4 TABLET ORAL at 12:23

## 2024-01-15 RX ADMIN — ACETAMINOPHEN 650 MG: 325 TABLET ORAL at 10:31

## 2024-01-15 RX ADMIN — ACETAMINOPHEN 650 MG: 325 TABLET ORAL at 22:23

## 2024-01-15 ASSESSMENT — PAIN SCALES - GENERAL: PAINLEVEL_OUTOF10: 3

## 2024-01-15 ASSESSMENT — PAIN - FUNCTIONAL ASSESSMENT: PAIN_FUNCTIONAL_ASSESSMENT: PREVENTS OR INTERFERES SOME ACTIVE ACTIVITIES AND ADLS

## 2024-01-15 ASSESSMENT — PAIN DESCRIPTION - ORIENTATION: ORIENTATION: RIGHT

## 2024-01-15 ASSESSMENT — PAIN DESCRIPTION - LOCATION: LOCATION: LEG

## 2024-01-15 ASSESSMENT — PAIN DESCRIPTION - DESCRIPTORS: DESCRIPTORS: SORE

## 2024-01-15 NOTE — ACP (ADVANCE CARE PLANNING)
Advance Care Planning     Advance Care Planning Inpatient Note  Spiritual Care Department    Today's Date: 1/15/2024  Unit: [place] C3 TELE/MED SURG/ONC    Received request from IDT Member.  Upon review of chart and communication with care team, Spiritual Care will defer advance care planning with patient at this time..   called patient's wife, at her request, to update the team about patient's advance directive documents.  She will not be coming to the hospital for the next few days due to the patient's diagnosis of Covid and her exposure.      Goals of ACP Conversation:  Spouse states they have a health care power of  and a living will drafted by their .  She is going to bring a copy of the documents she has when she comes to the hospital.    Health Care Decision Makers:       Primary Decision Maker: Cari Bonds - Spouse - [phone]    Secondary Decision Maker: darcie bonds - Child - [phone],  Patient also has a daughter in the [place] area who has equal decision making authority as her brother if something happens to patient's spouse where she is unable to act as a proxy.  Summary:  Documented Next of Kin, per patient report    Advance Care Planning Documents (Patient Wishes):  Healthcare Power of /Advance Directive Appointment of Health Care Agent  Living Will/Advance Directive     Assessment:  Patient is confused and COVID+.  Spouse states he was fine a few days ago and she is hoping something is treatable.  She states she does not feel patient is at the point his living will would be activated, since his current decline was so sudden.  Interventions:  Requested patient/family to submit existing document for our records: Healthcare Power of /Advance Directive Appointment of Health Care Agent  Living Will/Advance Directive  Discussed and provided education on state decision maker hierarchy    Care Preferences Communicated:   Spouse confirmed patient's wishes are

## 2024-01-15 NOTE — PLAN OF CARE
Bedside swallow evaluation completed this date.    Jada Payne M.S. CCC-SLP  Speech-language pathologist  [id number]      Speech/lang/cog evaluation completed this date.    Jada Payne M.S. CCC-SLP  Speech-language pathologist  [id number]

## 2024-01-15 NOTE — CARE COORDINATION
Case Management Assessment  Initial Evaluation    Date/Time of Evaluation: 1/15/2024 1:50 PM  Assessment Completed by: Ignacio Torres RN    If patient is discharged prior to next notation, then this note serves as note for discharge by case management.    Patient Name: Zaid Ortega                   YOB: 1945  Diagnosis: Confusion [R41.0]  Altered mental state [R41.82]  Encephalopathy [G93.40]                   Date / Time: 1/13/2024  2:21 PM    Patient Admission Status: Inpatient   Readmission Risk (Low < 19, Mod (19-27), High > 27): Readmission Risk Score: 8.9    Current PCP: Galina Rasmussen APRN - CNP  PCP verified by CM? Yes    Chart Reviewed: Yes      History Provided by: Patient  Patient Orientation: Alert and Oriented, Person, Place, Situation, Self    Patient Cognition: Alert    Hospitalization in the last 30 days (Readmission):  No    If yes, Readmission Assessment in CM Navigator will be completed.    Advance Directives:      Code Status: Limited   Patient's Primary Decision Maker is: Legal Next of Kin    Primary Decision Maker: CammieKrunal - Spouse - [phone]    Secondary Decision Maker: cammielinda barrioson - Child - [phone]    Discharge Planning:    Patient lives with: Spouse/Significant Other Type of Home: House  Primary Care Giver: Self  Patient Support Systems include: Spouse/Significant Other   Current Financial resources: Medicare  Current community resources: Housing  Current services prior to admission: None            Current DME:              Type of Home Care services:  None    ADLS  Prior functional level: Independent in ADLs/IADLs  Current functional level: Independent in ADLs/IADLs    PT AM-PAC: 11 /24  OT AM-PAC: 14 /24    Family can provide assistance at DC: Yes  Would you like Case Management to discuss the discharge plan with any other family members/significant others, and if so, who? Yes (wife, krunal)  Plans to Return to Present Housing: Yes  Other Identified

## 2024-01-15 NOTE — FLOWSHEET NOTE
Pt voided 50ml felipe urine; see note below for PVR.  I will recheck again after next void.     01/15/24 1026   Urine Assessment   Urinary Status Voiding   Urinary Incontinence Absent   Urine Color Felipe   Bladder Scan Volume (mL) 287 mL   $ Bladder scan $ Yes

## 2024-01-15 NOTE — PLAN OF CARE
Problem: Pain  Goal: Verbalizes/displays adequate comfort level or baseline comfort level  Outcome: Progressing  Flowsheets (Taken 1/14/2024 0118 by Magdaleno Briggs RN)  Verbalizes/displays adequate comfort level or baseline comfort level:   Encourage patient to monitor pain and request assistance   Assess pain using appropriate pain scale   Administer analgesics based on type and severity of pain and evaluate response   Implement non-pharmacological measures as appropriate and evaluate response

## 2024-01-15 NOTE — PLAN OF CARE
Problem: Safety - Adult  Goal: Free from fall injury  1/15/2024 1154 by Valeria Chavez, RN  Outcome: Progressing  Flowsheets (Taken 1/15/2024 1154)  Free From Fall Injury:   Instruct family/caregiver on patient safety   Based on caregiver fall risk screen, instruct family/caregiver to ask for assistance with transferring infant if caregiver noted to have fall risk factors

## 2024-01-16 LAB — TSH SERPL DL<=0.005 MIU/L-ACNC: 0.37 UIU/ML (ref 0.27–4.2)

## 2024-01-16 PROCEDURE — 2580000003 HC RX 258: Performed by: NURSE PRACTITIONER

## 2024-01-16 PROCEDURE — 51798 US URINE CAPACITY MEASURE: CPT

## 2024-01-16 PROCEDURE — 2700000000 HC OXYGEN THERAPY PER DAY

## 2024-01-16 PROCEDURE — 84443 ASSAY THYROID STIM HORMONE: CPT

## 2024-01-16 PROCEDURE — 36415 COLL VENOUS BLD VENIPUNCTURE: CPT

## 2024-01-16 PROCEDURE — 6370000000 HC RX 637 (ALT 250 FOR IP): Performed by: NURSE PRACTITIONER

## 2024-01-16 PROCEDURE — 6360000002 HC RX W HCPCS: Performed by: INTERNAL MEDICINE

## 2024-01-16 PROCEDURE — 1200000000 HC SEMI PRIVATE

## 2024-01-16 PROCEDURE — 94761 N-INVAS EAR/PLS OXIMETRY MLT: CPT

## 2024-01-16 PROCEDURE — 6370000000 HC RX 637 (ALT 250 FOR IP): Performed by: INTERNAL MEDICINE

## 2024-01-16 RX ADMIN — ACETAMINOPHEN 650 MG: 325 TABLET ORAL at 08:10

## 2024-01-16 RX ADMIN — ENOXAPARIN SODIUM 40 MG: 100 INJECTION SUBCUTANEOUS at 08:10

## 2024-01-16 RX ADMIN — SODIUM CHLORIDE, PRESERVATIVE FREE 10 ML: 5 INJECTION INTRAVENOUS at 21:23

## 2024-01-16 RX ADMIN — GUAIFENESIN AND DEXTROMETHORPHAN 5 ML: 100; 10 SYRUP ORAL at 14:10

## 2024-01-16 RX ADMIN — DEXAMETHASONE 6 MG: 4 TABLET ORAL at 08:10

## 2024-01-16 RX ADMIN — TAMSULOSIN HYDROCHLORIDE 0.4 MG: 0.4 CAPSULE ORAL at 08:10

## 2024-01-16 RX ADMIN — ACETAMINOPHEN 650 MG: 325 TABLET ORAL at 22:28

## 2024-01-16 RX ADMIN — SODIUM CHLORIDE, PRESERVATIVE FREE 10 ML: 5 INJECTION INTRAVENOUS at 08:10

## 2024-01-16 ASSESSMENT — PAIN SCALES - GENERAL
PAINLEVEL_OUTOF10: 5
PAINLEVEL_OUTOF10: 5

## 2024-01-16 ASSESSMENT — PAIN DESCRIPTION - LOCATION: LOCATION: NECK;THROAT;HEAD

## 2024-01-16 NOTE — FLOWSHEET NOTE
Prevoid bladder scan (440).  Pt voided unknown exact amount in restroom; PVR as below.     01/15/24 1810   Urine Assessment   Urinary Status Voiding   Urinary Incontinence Absent   Urine Color Red   Urine Appearance Blood clots   Bladder Scan Volume (mL) 211 mL   $ Bladder scan $ Yes

## 2024-01-16 NOTE — CARE COORDINATION
Chart reviewed day 3. Spoke with patients wife, Cari. Stated would like referrals to [facility], [facility], [facility], [facility]. Done, waiting determinations. Ignacio Torres RN    No sitter with patient today. [facility] cannot accept due to covid. Call to [facility], [facility], [facility] at [facility] message left. [facility] still reviewing. [facility] cannot [facility]. Forwarded referral to [facility] for review. [facility] cannot [facility]. Ignacio Torres RN

## 2024-01-16 NOTE — PLAN OF CARE
Problem: Pain  Goal: Verbalizes/displays adequate comfort level or baseline comfort level  Outcome: Progressing  Flowsheets (Taken 1/16/2024 1400)  Verbalizes/displays adequate comfort level or baseline comfort level:   Encourage patient to monitor pain and request assistance   Administer analgesics based on type and severity of pain and evaluate response   Assess pain using appropriate pain scale     Problem: Safety - Adult  Goal: Free from fall injury  Outcome: Progressing     Problem: Skin/Tissue Integrity  Goal: Absence of new skin breakdown  Description: 1.  Monitor for areas of redness and/or skin breakdown  2.  Assess vascular access sites hourly  3.  Every 4-6 hours minimum:  Change oxygen saturation probe site  4.  Every 4-6 hours:  If on nasal continuous positive airway pressure, respiratory therapy assess nares and determine need for appliance change or resting period.  Outcome: Progressing

## 2024-01-17 PROCEDURE — 1200000000 HC SEMI PRIVATE

## 2024-01-17 PROCEDURE — 6370000000 HC RX 637 (ALT 250 FOR IP): Performed by: INTERNAL MEDICINE

## 2024-01-17 PROCEDURE — 6360000002 HC RX W HCPCS: Performed by: INTERNAL MEDICINE

## 2024-01-17 PROCEDURE — 97110 THERAPEUTIC EXERCISES: CPT

## 2024-01-17 PROCEDURE — 97535 SELF CARE MNGMENT TRAINING: CPT

## 2024-01-17 PROCEDURE — 97116 GAIT TRAINING THERAPY: CPT

## 2024-01-17 PROCEDURE — 6370000000 HC RX 637 (ALT 250 FOR IP): Performed by: STUDENT IN AN ORGANIZED HEALTH CARE EDUCATION/TRAINING PROGRAM

## 2024-01-17 PROCEDURE — 2580000003 HC RX 258: Performed by: NURSE PRACTITIONER

## 2024-01-17 RX ORDER — MECOBALAMIN 5000 MCG
5 TABLET,DISINTEGRATING ORAL NIGHTLY PRN
Status: DISCONTINUED | OUTPATIENT
Start: 2024-01-17 | End: 2024-01-18

## 2024-01-17 RX ADMIN — ENOXAPARIN SODIUM 40 MG: 100 INJECTION SUBCUTANEOUS at 10:16

## 2024-01-17 RX ADMIN — SODIUM CHLORIDE, PRESERVATIVE FREE 10 ML: 5 INJECTION INTRAVENOUS at 19:59

## 2024-01-17 RX ADMIN — TAMSULOSIN HYDROCHLORIDE 0.4 MG: 0.4 CAPSULE ORAL at 10:17

## 2024-01-17 RX ADMIN — Medication 5 MG: at 19:59

## 2024-01-17 RX ADMIN — SODIUM CHLORIDE, PRESERVATIVE FREE 10 ML: 5 INJECTION INTRAVENOUS at 10:22

## 2024-01-17 RX ADMIN — DEXAMETHASONE 6 MG: 4 TABLET ORAL at 10:17

## 2024-01-17 ASSESSMENT — PAIN SCALES - GENERAL: PAINLEVEL_OUTOF10: 3

## 2024-01-17 ASSESSMENT — PAIN DESCRIPTION - LOCATION: LOCATION: BACK

## 2024-01-17 ASSESSMENT — PAIN DESCRIPTION - ORIENTATION: ORIENTATION: MID

## 2024-01-17 NOTE — PLAN OF CARE
Standard safety measures in place with avasure camera on. Ambulates in room with walker with contact guard assist, unsteady gait.   Problem: Safety - Adult  Goal: Free from fall injury  1/17/2024 0511 by Katina Vaz RN  Outcome: Progressing

## 2024-01-17 NOTE — CARE COORDINATION
Chart reviewed. Multiple calls to [facility] to check status of referral. No response. Wife stated to take them off the list. Spoke with wife about further choices since none of the places we referred to can accept for varius reasons. Reviewing list. Ok with referral to [facility], Done. [facility] cannot accept as at this time as they have their last quarantine patient coming out tomorrow. Waiting on [facility] response. Did update that therapy recs from PT and OT are improving. Now recommending SNF vs HHC and assist. Still seeking SNF.  Ignacio Torres RN    Spoke with wife further. Would like additional referrals to [facility], [facility], [facility], [facility] and [facility]. Done waiting determinations. Ignacio Torres RN

## 2024-01-17 NOTE — PLAN OF CARE
Problem: Safety - Adult  Goal: Free from fall injury  1/17/2024 1304 by Ladonna Linn, RN  Outcome: Progressing  Flowsheets (Taken 1/15/2024 1154 by Valeria Chavez, RN)  Free From Fall Injury:   Instruct family/caregiver on patient safety   Based on caregiver fall risk screen, instruct family/caregiver to ask for assistance with transferring infant if caregiver noted to have fall risk factors  1/17/2024 0511 by Katina Vaz, RN  Outcome: Progressing     Problem: Skin/Tissue Integrity  Goal: Absence of new skin breakdown  Description: 1.  Monitor for areas of redness and/or skin breakdown  2.  Assess vascular access sites hourly  3.  Every 4-6 hours minimum:  Change oxygen saturation probe site  4.  Every 4-6 hours:  If on nasal continuous positive airway pressure, respiratory therapy assess nares and determine need for appliance change or resting period.  Outcome: Progressing

## 2024-01-18 PROCEDURE — 2580000003 HC RX 258: Performed by: NURSE PRACTITIONER

## 2024-01-18 PROCEDURE — 6360000002 HC RX W HCPCS: Performed by: INTERNAL MEDICINE

## 2024-01-18 PROCEDURE — 1200000000 HC SEMI PRIVATE

## 2024-01-18 PROCEDURE — 6370000000 HC RX 637 (ALT 250 FOR IP): Performed by: INTERNAL MEDICINE

## 2024-01-18 PROCEDURE — 97535 SELF CARE MNGMENT TRAINING: CPT

## 2024-01-18 PROCEDURE — 97110 THERAPEUTIC EXERCISES: CPT

## 2024-01-18 RX ORDER — MECOBALAMIN 5000 MCG
5 TABLET,DISINTEGRATING ORAL NIGHTLY
Status: DISCONTINUED | OUTPATIENT
Start: 2024-01-18 | End: 2024-01-22 | Stop reason: HOSPADM

## 2024-01-18 RX ADMIN — TAMSULOSIN HYDROCHLORIDE 0.4 MG: 0.4 CAPSULE ORAL at 09:10

## 2024-01-18 RX ADMIN — SODIUM CHLORIDE, PRESERVATIVE FREE 10 ML: 5 INJECTION INTRAVENOUS at 21:52

## 2024-01-18 RX ADMIN — ENOXAPARIN SODIUM 40 MG: 100 INJECTION SUBCUTANEOUS at 09:10

## 2024-01-18 RX ADMIN — Medication 5 MG: at 21:52

## 2024-01-18 NOTE — CARE COORDINATION
Chart reviewed day 5. From referrals made yesterday:  [facility]=not in network  [facility]= no covid  [facility] =no  [facility]= still reviewing. Needs to evaluate behaviors x 48 hrs prior to initiating pre cert.  [facility]=yes  [facility]= pending.   Ignacio Torres RN      [facility] cannot accept. Call to Cari to update [facility] is only facility that can accept. Will verify can initiate pre cert. Ignacio Torres RN    Spoke with [facility]. Ok with starting pre cert at [facility]. Notified. Notified facility will initiate today. Ignacio Torres RN

## 2024-01-19 PROCEDURE — 97535 SELF CARE MNGMENT TRAINING: CPT

## 2024-01-19 PROCEDURE — 6370000000 HC RX 637 (ALT 250 FOR IP): Performed by: INTERNAL MEDICINE

## 2024-01-19 PROCEDURE — 1200000000 HC SEMI PRIVATE

## 2024-01-19 PROCEDURE — 97110 THERAPEUTIC EXERCISES: CPT

## 2024-01-19 PROCEDURE — 2580000003 HC RX 258: Performed by: NURSE PRACTITIONER

## 2024-01-19 PROCEDURE — 6360000002 HC RX W HCPCS: Performed by: INTERNAL MEDICINE

## 2024-01-19 PROCEDURE — 97530 THERAPEUTIC ACTIVITIES: CPT

## 2024-01-19 RX ORDER — TAMSULOSIN HYDROCHLORIDE 0.4 MG/1
0.4 CAPSULE ORAL DAILY
Qty: 30 CAPSULE | Refills: 0
Start: 2024-01-20 | End: 2024-01-22

## 2024-01-19 RX ADMIN — ENOXAPARIN SODIUM 40 MG: 100 INJECTION SUBCUTANEOUS at 08:08

## 2024-01-19 RX ADMIN — SODIUM CHLORIDE, PRESERVATIVE FREE 10 ML: 5 INJECTION INTRAVENOUS at 07:50

## 2024-01-19 RX ADMIN — TAMSULOSIN HYDROCHLORIDE 0.4 MG: 0.4 CAPSULE ORAL at 08:08

## 2024-01-19 RX ADMIN — SODIUM CHLORIDE, PRESERVATIVE FREE 10 ML: 5 INJECTION INTRAVENOUS at 20:55

## 2024-01-19 RX ADMIN — Medication 5 MG: at 20:54

## 2024-01-19 ASSESSMENT — PAIN SCALES - GENERAL
PAINLEVEL_OUTOF10: 2
PAINLEVEL_OUTOF10: 0

## 2024-01-19 NOTE — PLAN OF CARE
Problem: Safety - Adult  Goal: Free from fall injury  Recent Flowsheet Documentation  Taken 1/18/2024 2100 by Jocelyne Mcgraw RN  Free From Fall Injury:   Instruct family/caregiver on patient safety   Based on caregiver fall risk screen, instruct family/caregiver to ask for assistance with transferring infant if caregiver noted to have fall risk factors

## 2024-01-19 NOTE — CARE COORDINATION
D/c order noted. Spoke with [facility] anticipates skilled nursing cert completion today. Waiting decision. Ignacio Torres, JAMA      [id number] [facility] Cert remains pending for skilled stay at [facility]. Ignacio Torres RN

## 2024-01-20 PROCEDURE — 6370000000 HC RX 637 (ALT 250 FOR IP): Performed by: INTERNAL MEDICINE

## 2024-01-20 PROCEDURE — 2580000003 HC RX 258: Performed by: NURSE PRACTITIONER

## 2024-01-20 PROCEDURE — 1200000000 HC SEMI PRIVATE

## 2024-01-20 PROCEDURE — 6360000002 HC RX W HCPCS: Performed by: INTERNAL MEDICINE

## 2024-01-20 RX ADMIN — Medication 5 MG: at 20:53

## 2024-01-20 RX ADMIN — SODIUM CHLORIDE, PRESERVATIVE FREE 10 ML: 5 INJECTION INTRAVENOUS at 10:45

## 2024-01-20 RX ADMIN — TAMSULOSIN HYDROCHLORIDE 0.4 MG: 0.4 CAPSULE ORAL at 10:45

## 2024-01-20 RX ADMIN — SODIUM CHLORIDE, PRESERVATIVE FREE 10 ML: 5 INJECTION INTRAVENOUS at 20:53

## 2024-01-20 RX ADMIN — ENOXAPARIN SODIUM 40 MG: 100 INJECTION SUBCUTANEOUS at 10:45

## 2024-01-20 NOTE — PLAN OF CARE
Problem: Pain  Goal: Verbalizes/displays adequate comfort level or baseline comfort level  Outcome: Progressing  Flowsheets (Taken 1/20/2024 0056)  Verbalizes/displays adequate comfort level or baseline comfort level:   Encourage patient to monitor pain and request assistance   Assess pain using appropriate pain scale   Implement non-pharmacological measures as appropriate and evaluate response   Administer analgesics based on type and severity of pain and evaluate response   Notify Licensed Independent Practitioner if interventions unsuccessful or patient reports new pain   Consider cultural and social influences on pain and pain management     Problem: Safety - Adult  Goal: Free from fall injury  Outcome: Progressing  Flowsheets (Taken 1/20/2024 0056)  Free From Fall Injury:   Based on caregiver fall risk screen, instruct family/caregiver to ask for assistance with transferring infant if caregiver noted to have fall risk factors   Instruct family/caregiver on patient safety     Problem: Skin/Tissue Integrity  Goal: Absence of new skin breakdown  Description: 1.  Monitor for areas of redness and/or skin breakdown  2.  Assess vascular access sites hourly  3.  Every 4-6 hours minimum:  Change oxygen saturation probe site  4.  Every 4-6 hours:  If on nasal continuous positive airway pressure, respiratory therapy assess nares and determine need for appliance change or resting period.  Outcome: Progressing

## 2024-01-20 NOTE — CARE COORDINATION
CM called [facility] to check on cert. Liaison did not answer and voice mail box is full. CM will attempt calling again.

## 2024-01-21 PROCEDURE — 6360000002 HC RX W HCPCS: Performed by: STUDENT IN AN ORGANIZED HEALTH CARE EDUCATION/TRAINING PROGRAM

## 2024-01-21 PROCEDURE — 6360000002 HC RX W HCPCS: Performed by: INTERNAL MEDICINE

## 2024-01-21 PROCEDURE — 6370000000 HC RX 637 (ALT 250 FOR IP): Performed by: INTERNAL MEDICINE

## 2024-01-21 PROCEDURE — 2580000003 HC RX 258: Performed by: NURSE PRACTITIONER

## 2024-01-21 PROCEDURE — 1200000000 HC SEMI PRIVATE

## 2024-01-21 RX ADMIN — QUETIAPINE FUMARATE 25 MG: 25 TABLET ORAL at 20:37

## 2024-01-21 RX ADMIN — TAMSULOSIN HYDROCHLORIDE 0.4 MG: 0.4 CAPSULE ORAL at 11:33

## 2024-01-21 RX ADMIN — Medication 5 MG: at 20:37

## 2024-01-21 RX ADMIN — DIPHENHYDRAMINE HYDROCHLORIDE 12.5 MG: 50 INJECTION INTRAMUSCULAR; INTRAVENOUS at 02:11

## 2024-01-21 RX ADMIN — ENOXAPARIN SODIUM 40 MG: 100 INJECTION SUBCUTANEOUS at 11:40

## 2024-01-21 RX ADMIN — SODIUM CHLORIDE, PRESERVATIVE FREE 10 ML: 5 INJECTION INTRAVENOUS at 11:32

## 2024-01-21 NOTE — CARE COORDINATION
ARIS left another message with the liaison with [facility] to enquire about cert. Awaiting return call.

## 2024-01-22 VITALS
RESPIRATION RATE: 18 BRPM | OXYGEN SATURATION: 95 % | HEART RATE: 77 BPM | SYSTOLIC BLOOD PRESSURE: 153 MMHG | TEMPERATURE: 97.8 F | HEIGHT: 68 IN | BODY MASS INDEX: 27.06 KG/M2 | WEIGHT: 178.57 LBS | DIASTOLIC BLOOD PRESSURE: 83 MMHG

## 2024-01-22 PROCEDURE — 6370000000 HC RX 637 (ALT 250 FOR IP): Performed by: INTERNAL MEDICINE

## 2024-01-22 PROCEDURE — 97110 THERAPEUTIC EXERCISES: CPT

## 2024-01-22 PROCEDURE — 97116 GAIT TRAINING THERAPY: CPT

## 2024-01-22 PROCEDURE — 97530 THERAPEUTIC ACTIVITIES: CPT

## 2024-01-22 RX ORDER — QUETIAPINE FUMARATE 25 MG/1
25 TABLET, FILM COATED ORAL NIGHTLY PRN
Qty: 30 TABLET | Refills: 0 | Status: SHIPPED | OUTPATIENT
Start: 2024-01-22 | End: 2024-02-21

## 2024-01-22 RX ORDER — CHOLECALCIFEROL (VITAMIN D3) 125 MCG
5 CAPSULE ORAL DAILY
Qty: 30 TABLET | Refills: 0 | Status: SHIPPED | OUTPATIENT
Start: 2024-01-22 | End: 2024-02-21

## 2024-01-22 RX ORDER — TAMSULOSIN HYDROCHLORIDE 0.4 MG/1
0.4 CAPSULE ORAL DAILY
Qty: 30 CAPSULE | Refills: 0 | Status: SHIPPED | OUTPATIENT
Start: 2024-01-22

## 2024-01-22 RX ADMIN — TAMSULOSIN HYDROCHLORIDE 0.4 MG: 0.4 CAPSULE ORAL at 10:05

## 2024-01-22 NOTE — HOME CARE
Zaid Ortega will require the following home care treatments or therapies: Skilled Nursing, vital signs, medication compliance and education, PT/OT, wound care, teaching and management of medical conditions, etc.  Home care will be necessary because of deconditioning.  The patient is in agreement to receiving home care.

## 2024-01-22 NOTE — CARE COORDINATION
JHONATHAN placed a call to [facility] Santoro contact to check status of precert.     JHONATHAN received a call back from Maude. Per Martina Santoro (who stated precert) was notified on Friday, January 19th,2024 around 330 PM about precert being a Intent to deny and a P2P was offered and needs to be completed by this date at noon.     MD needs to [phone] option 4.   Reference #: [id number]    ARIS has been made aware of the above and will update MD.   Rocio Huggins MSW

## 2024-01-22 NOTE — PROGRESS NOTES
Hospital Medicine Progress Note      Date of Admission: 1/13/2024  Hospital Day: 8    Chief Admission Complaint:  confusion     Subjective:  confusion resolved. No new complaints.    Presenting Admission History:       Zaid Ortega is a/an 78 y.o. male with a significant past medical history of MGUS, BPH, iron deficient anemia, chronic back pain status post prior lumbar surgery who presents to [facility]'s emergency department with wife at bedside who notes that since the patient exhibited unusual behavior last night.  Wife notes that he has been doing what sounds like fantasy football, picking football players with groups of friends, became fixated on this overnight last night which is unusual for him.  Patient's wife notes that she was awakened around 2 or 3 in the morning, patient was rummaging through the house and when she asked what he was looking for, he responded \"I do not know but I need to find it\".  This continued until the morning which she notes he seemed to normalize around breakfast time and seems to be back to normal now.  She does admit that he has had memory issues over the last couple years, her and the patient endorsed that they have an appointment with health and aging [facility] at [facility] in February.  Patient admits to being able to recall this incident, states feels that he is mentally intact at this time.  There have been no other recent symptoms or injuries leading up to today.  His evaluation emergency form included laboratory studies, EKG, chest x-ray, CT of the head, CT C-spine.  Imaging was negative for any acute findings; CT C-spine did show multilevel degenerative changes at C5/C6.  Laboratory studies were unremarkable, mild elevation of troponin at 23 with repeat of 24, no recent reported chest pain.  Cell count did show WBC of 3.5, hemoglobin 13.2, platelets 214.  Urinalysis showed large blood; 1+ mucus, 5-10 urinary RBCs, rare bacteria, with yeast.  Hospital team was 
  Hospital Medicine Progress Note      Date of Admission: 1/13/2024  Hospital Day: 9    Chief Admission Complaint:  confusion     Subjective:  confusion resolved. No new complaints.    Presenting Admission History:       Zaid Ortega is a/an 78 y.o. male with a significant past medical history of MGUS, BPH, iron deficient anemia, chronic back pain status post prior lumbar surgery who presents to [facility]'s emergency department with wife at bedside who notes that since the patient exhibited unusual behavior last night.  Wife notes that he has been doing what sounds like fantasy football, picking football players with groups of friends, became fixated on this overnight last night which is unusual for him.  Patient's wife notes that she was awakened around 2 or 3 in the morning, patient was rummaging through the house and when she asked what he was looking for, he responded \"I do not know but I need to find it\".  This continued until the morning which she notes he seemed to normalize around breakfast time and seems to be back to normal now.  She does admit that he has had memory issues over the last couple years, her and the patient endorsed that they have an appointment with health and aging Center at [facility] in February.  Patient admits to being able to recall this incident, states feels that he is mentally intact at this time.  There have been no other recent symptoms or injuries leading up to today.  His evaluation emergency form included laboratory studies, EKG, chest x-ray, CT of the head, CT C-spine.  Imaging was negative for any acute findings; CT C-spine did show multilevel degenerative changes at C5/C6.  Laboratory studies were unremarkable, mild elevation of troponin at 23 with repeat of 24, no recent reported chest pain.  Cell count did show WBC of 3.5, hemoglobin 13.2, platelets 214.  Urinalysis showed large blood; 1+ mucus, 5-10 urinary RBCs, rare bacteria, with yeast.  Hospital team was 
  Physician Progress Note      PATIENT:               KATHRIN JOHN  CSN #:                  [id number]  :                       1945  ADMIT DATE:       2024 2:21 PM  DISCH DATE:  RESPONDING  PROVIDER #:        Maciel Ku MD          QUERY TEXT:    Patient admitted with confusion. Noted documentation of acute respiratory   failure in PN . In order to support the diagnosis of acute respiratory   failure, please include additional clinical indicators in your documentation.    Or please document if the diagnosis of acute respiratory failure has been   ruled out after further study.    The medical record reflects the following:  Risk Factors: COVID, dementia, age  Clinical Indicators:  01/15- spO2 84-94% RA- 3L NC  - spO2 89-93% RA-2L NC  PN -  \"I was notified that he is having some respiratory symptoms.  F/u repeat CXR,   procalcitonin, COVID/flu swab, and RSV swab.\"  PN 01/15-  \"Respiratory:  Normal respiratory effort.  Bilaterally without   Rales/Wheezes/Rhonchi.  Rattling cough.\"  PN -  \"Respiratory:  Normal respiratory effort.  Bilaterally without   Rales/Wheezes/Rhonchi.  Rattling cough has resolved.  COVID-19, with acute hypoxic respiratory failure.  Dexamethasone started 1/15,   discussed risk of agitation with wife.  Wean to RA as tolerated.  The patient   has no supplemental O2 requirement at baseline. \"  Treatment: Oxygen, IS, decadron, serial labs, and supportive care    Thank you,  Lia Michelle, RN, BSN, CRCR    Acute Respiratory Failure Clinical Indicators per 3M MS-DRG Training Guide and   Quick Reference Guide:  pO2 < 60 mmHg or SpO2 (pulse oximetry) < 91% breathing room air  pCO2 > 50 and pH < 7.35  P/F ratio (pO2 / FIO2) < 300  pO2 decrease or pCO2 increase by 10 mmHg from baseline (if known)  Supplemental oxygen of 40% or more  Presence of respiratory distress, tachypnea, dyspnea, shortness of breath,   wheezing  Unable to speak in complete sentences  Use of 
  Speech Language Pathology  Facility/Department: [facility] C3 TELE/MED SURG/ONC  Initial Speech/Language/Cognitive Assessment       NAME:Zaid Ortega  : 1945 (78 y.o.)   MRN: [id number]  ROOM: [id number]/[id number]  ADMISSION DATE: 2024  PATIENT DIAGNOSIS(ES): Confusion [R41.0]  Altered mental state [R41.82]  Encephalopathy [G93.40]  Patient Active Problem List    Diagnosis Date Noted    Altered mental state 2024     Past Medical History:   Diagnosis Date    Dementia (HCC)     Hyperlipidemia     Iron (Fe) deficiency anemia     MGUS (monoclonal gammopathy of unknown significance)      Past Surgical History:   Procedure Laterality Date    BACK SURGERY       No Known Allergies    Date of Evaluation: 1/15/2024   Evaluating Therapist: MIKAELA Ayers    Subjective:   Chart Reviewed: : [x] Yes [] No    Onset Date: pt admitted to [facility] on 24    Recent Results of MRI of the Brain W WO Contrast:  Date: 1/15/24  Impressions: IMPRESSION:  1. No acute intracranial abnormality.  No acute infarct.  2. Moderate global parenchymal volume loss with mild chronic microvascular  ischemic changes.  3.  The ventricular dilatation is out of proportion to the cortical sulci.  While this may be due to predominantly central parenchymal volume loss, a  component of normal pressure hydrocephalus cannot be excluded.    Medical record review/interview: Per MD H&P on 24: \"Zaid Ortega is a/an 78 y.o. male with a significant past medical history of MGUS, BPH, iron deficient anemia, chronic back pain status post prior lumbar surgery who presents to [facility]'s emergency department with wife at bedside who notes that since the patient exhibited unusual behavior last night.  Wife notes that he has been doing what sounds like fantasy football, picking football players with groups of friends, became fixated on this overnight last night which is unusual for him.  Patient's wife notes that she was awakened around 2 or 3 in 
..4 Eyes Skin Assessment     The patient is being assess for  Admission    I agree that 2 RN's have performed a thorough Head to Toe Skin Assessment on the patient. ALL assessment sites listed below have been assessed.       Areas assessed by both nurses: JAMA Dolan/ JAMA Abdi  [x]   Head, Face, and Ears   [x]   Shoulders, Back, and Chest  [x]   Arms, Elbows, and Hands   [x]   Coccyx, Sacrum, and IschIum  [x]   Legs, Feet, and Heels        Does the Patient have Skin Breakdown?  No         Birthmark at the back of right thigh  Parrish Prevention initiated:  Yes   Wound Care Orders initiated:  No      WOC nurse consulted for Pressure Injury (Stage 3,4, Unstageable, DTI, NWPT, and Complex wounds), New and Established Ostomies:  No      Nurse 1 eSignature: Electronically signed by Magdaleno Briggs RN on 1/14/24 at 6:55 AM EST    **SHARE this note so that the co-signing nurse is able to place an eSignature**    Nurse 2 eSignature: {Esignature:[id number]}            
Assessment completed and documented. VSS. A/ox4 but can be confused at times. Denies pain. Pt up with walker and assist to Bathroom. Tolerated well. Pt tolerated meals. Bed locked and in lowest position. Bedside table and call light within reach. Avasys on for pt safety. Denies further needs at this time.   
Assessment completed and documented. VSS. A/ox4. Pleasant and cooperative. Denies pain during assessment. Up to chair during assessment. Ate most of breakfast. Takes pills whole with water.SBA assist with transfers/ambulation. Bed locked and in lowest position. Bedside table and call light within reach. Denies further needs at this time.    
Awake on bed, oriented x 3, disoriented to place. IV on left hand. On Avasure. Side rails up x 2. Call light within reach. Bed alarm on. Denies further needs. Will continue to monitor.  
Awake on bed, oriented x 3, disoriented to place. Saline lock on left arm. Call light within reach. Side rails up x 2. Bed alarm on. Denies further needs. Will continue to monitor.  
Came to check on pt. Pt was was sitting on floor. Asked pt what happened pt said he was trying to do something illegal. Pt had taken off his socks and had them in his bed. Pt helped to chair. Neuro assessment completed and was negative. Completed by two RN's. MD notified. Charge and clinical notified. Post fall huddle completed. Avasys added to pt's room. Pt's wife notified and reported will call back with any results.     Pt seems to be leaning more to the right after re ambulating pt. MD notified again.MD wanting to do head CT and MRI to rule out stroke, which was a previous concern upon admission.           Before fall occurred, Pt was left in bed with x3 side rails up and tray over him w/ tv on and everything pt requested with in reach. Pt was reinforced and showed call light to call when needing assistance.   
Comprehensive Nutrition Assessment    Type and Reason for Visit:  Initial, RD Nutrition Re-Screen/LOS    Nutrition Recommendations/Plan:   Continue regular diet  Encourage po intakes  Monitor nutrition adequacy, pertinent labs, bowel habits, wt changes, and clinical progress     Malnutrition Assessment:  Malnutrition Status:  At risk for malnutrition (Comment) (01/18/24 1441)    Context:  Acute Illness     Findings of the 6 clinical characteristics of malnutrition:  Energy Intake:  Mild decrease in energy intake (Comment)    Nutrition Assessment:    LOS: 79 yo M w pmh dementia, HLD admitted with AMS. COVID positive, in isolation. Currently on regular diet with majority po intakes % per EMR. Unable to reach pt via phone. Per nsg note this AM, pt ate most of breakfast. No significant weight loss noted in past three months per EMR. Encourage intakes as tolerated. Will monitor nutritional status and need for ONS.    Nutrition Related Findings:    x1 BM 1/17. Labs reviewed. Wound Type: None       Current Nutrition Intake & Therapies:    Average Meal Intake: 51-75%, %, 1-25%, 26-50%  Average Supplements Intake: None Ordered  ADULT DIET; Regular    Anthropometric Measures:  Height: 172.7 cm (5' 8\")  Ideal Body Weight (IBW): 154 lbs (70 kg)       Current Body Weight: 81 kg (178 lb 9.2 oz),   IBW. Weight Source: Bed Scale  Current BMI (kg/m2): 27.2  Usual Body Weight: 84.5 kg (186 lb 4.6 oz) (10/11)  % Weight Change (Calculated): -4.1  Weight Adjustment For: No Adjustment                 BMI Categories: Overweight (BMI 25.0-29.9)    Estimated Daily Nutrient Needs:  Energy Requirements Based On: Kcal/kg (25-30)  Weight Used for Energy Requirements: Ideal  Energy (kcal/day): 0589-3114 kcals  Weight Used for Protein Requirements: Ideal (1-1.2)  Protein (g/day): 70-84 g  Method Used for Fluid Requirements: 1 ml/kcal  Fluid (ml/day): 7904-3470 ml    Nutrition Diagnosis:   Inadequate oral intake related to inadequate 
Head to toe completed and documented. Alert and oriented x2. Denies pain. Pt kept stating \"my feet dont work right\" throughout the night, was able to ambulate but appeared distressed and a bit more confused. Pt reassured.   
Head to toe completed and documented. Pt alert and oriented with some confusion that worsens throughout the night. Pt regularly ringing the call light but stating that he does not know what he needs and refuses care from the nursing staff when offering help.     Pt did have moments early in the night when he was crying. Family called writer to express concerns. Informed family they were able to visit in am.    
Hospital Medicine Progress Note        Subjective:  He developed a cough and fever.  Started on oxygen.       General appearance: No apparent distress, appears stated age.  HEENT: Pupils equal, round.  Conjunctivae/corneas clear.  Neck: No jugular venous distention.   Respiratory:  Normal respiratory effort.  Bilaterally without Rales/Wheezes/Rhonchi.  Rattling cough.  Cardiovascular: Normal rate and regular rhythm with normal S1/S2 without murmurs, rubs or gallops.  Abdomen: Soft, non-tender, non-distended with normal bowel sounds.  Musculoskeletal: No cyanosis bilaterally.  No edema.  Without deformity.  Skin: No jaundice.  No rashes or lesions.  Neurologic:  Neurovascularly intact without any focal sensory/motor deficits. Cranial nerves: II-XII intact, grossly non-focal.  Psychiatric: tired, oriented to self and month/year, but not sure which hospital, does not have much insight.      Assessment and Plan:       The patient is a pleasant 78 Y M with a h/o MGUS, BPH, iron deficiency anemia, and chronic back pain with prior surgery.  He has been getting more and more forgetful for years.  His wife says it is common for him to behave strangely.  He has lived in their home for years but he has asked \"how many bathrooms do we have?\"  He wandered off and got lost at a store, couldn't remember where they were supposed to meet.  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  She said that this has been progressing over the last few years, and she keeps a journal of when he acts like this.  She said that initially she could enter something in her journal every couple weeks but it has gradually progressed to the point that he does something like this multiple times ever day.  She has suspected that he has dementia and is seeking a formal diagnosis for this in a specialty clinic next month.              She brought him here because he had a really bad night.  He was writing down 
Hospital Medicine Progress Note        Subjective:  He looks so much better today.  More alert, energetic, and talkative.  Afebrile.  Off oxygen as of this morning.  SNF search is underway.      General appearance: No apparent distress, appears stated age.  HEENT: Pupils equal, round.  Conjunctivae/corneas clear.  Neck: No jugular venous distention.   Respiratory:  Normal respiratory effort.  Bilaterally without Rales/Wheezes/Rhonchi.  Rattling cough has resolved.  Cardiovascular: Normal rate and regular rhythm with normal S1/S2 without murmurs, rubs or gallops.  Abdomen: Soft, non-tender, non-distended with normal bowel sounds.  Musculoskeletal: No cyanosis bilaterally.  No edema.  Without deformity.  Skin: No jaundice.  No rashes or lesions.  Neurologic:  Neurovascularly intact without any focal sensory/motor deficits. Cranial nerves: II-XII intact, grossly non-focal.  Psychiatric: alert, oriented to self and month/year, but not sure which hospital, does not have much insight.      Assessment and Plan:       The patient is a pleasant 78 Y M with a h/o MGUS, BPH, iron deficiency anemia, and chronic back pain with prior surgery.  He has been getting more and more forgetful for years.  His wife says it is common for him to behave strangely.  He has lived in their home for years but he has asked \"how many bathrooms do we have?\"  He wandered off and got lost at a store, couldn't remember where they were supposed to meet.  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  She said that this has been progressing over the last few years, and she keeps a journal of when he acts like this.  She said that initially she could enter something in her journal every couple weeks but it has gradually progressed to the point that he does something like this multiple times ever day.  She has suspected that he has dementia and is seeking a formal diagnosis for this in a specialty clinic 
I was notified that he is having some respiratory symptoms.  F/u repeat CXR, procalcitonin, COVID/flu swab, and RSV swab.  
OK to leave IV out per MD.   
Occupational Therapy  Facility/Department: [facility] C3 TELE/MED SURG/ONC  Occupational Therapy Initial Assessment    Name: Zaid Ortega  : 1945  MRN: [id number]  Date of Service: 2024    Discharge Recommendations:  Subacute/Skilled Nursing Facility  OT Equipment Recommendations  Equipment Needed: No (defer)       Patient Diagnosis(es): The primary encounter diagnosis was Confusion. A diagnosis of Encephalopathy was also pertinent to this visit.  Past Medical History:  has a past medical history of Dementia (HCC), Hyperlipidemia, Iron (Fe) deficiency anemia, and MGUS (monoclonal gammopathy of unknown significance).  Past Surgical History:  has a past surgical history that includes back surgery.       Therapy discharge recommendations take into account each patient's current medical complexities and are subject to input/oversight from the patient's healthcare team.     Barriers to Home Discharge:   [x] Steps to access home entry or bed/bath:   [x] Unable to transfer, ambulate, or propel wheelchair household distances without assist   [x] Limited available assist at home upon discharge    [] Patient or family requests d/c to post-acute facility    [x] Poor cognition/safety awareness for d/c to home alone    [] Unable to maintain ordered weight bearing status    [] Patient with salient signs of long-standing immobility   [x] Decreased independence with ADLs   [x] Increased risk for falls   [] Other:    If pt is unable to be seen after this session, please let this note serve as discharge summary.  Please see case management note for discharge disposition.  Thank you.    Assessment   Performance deficits / Impairments: Decreased functional mobility ;Decreased safe awareness;Decreased balance;Decreased ADL status;Decreased cognition;Decreased high-level IADLs;Decreased endurance;Decreased strength  Assessment: Pt presents to [facility] with altered mental status. Pt reports PLOF IND with ADLs, shared responsibilities 
Occupational Therapy  Facility/Department: [facility] C3 TELE/MED SURG/ONC  Daily Treatment Note  NAME: Zaid Ortega  : 1945  MRN: [id number]    Date of Service: 1/15/2024    Discharge Recommendations:  Subacute/Skilled Nursing Facility  OT Equipment Recommendations  Equipment Needed: No (defer to next level of care)    Therapy discharge recommendations take into account each patient's current medical complexities and are subject to input/oversight from the patient's healthcare team.     Barriers to Home Discharge:   [] Steps to access home entry or bed/bath:   [x] Unable to transfer, ambulate, or propel wheelchair household distances without assist   [] Limited available assist at home upon discharge    [x] Patient or family requests d/c to post-acute facility    [x] Poor cognition/safety awareness for d/c to home alone    [] Unable to maintain ordered weight bearing status    [] Patient with salient signs of long-standing immobility   [x] Decreased independence with ADLs   [x] Increased risk for falls   [] Other:    If pt is unable to be seen after this session, please let this note serve as discharge summary.  Please see case management note for discharge disposition.  Thank you.    Patient Diagnosis(es): The primary encounter diagnosis was Confusion. A diagnosis of Encephalopathy was also pertinent to this visit.     Assessment    Assessment: Pt seed for bed mobility and therex this date. Pt performed bed mobility with modA, requiring verbal and tactile cues for technique. He sat on EOB for ~20 min to perform BUE therex, which he required min-modA for balance d/t posterior lean. Pt is functioning below his baseline secondary to decreased strength, endurance, balance, and cognition. He will benefit from continued skilled acute OT services to maximize safety and IND with ADLs and functional mobility. SNF is recommended at d/c.  Activity Tolerance: Treatment limited secondary to decreased cognition;Patient 
Occupational Therapy  Facility/Department: [facility] C3 TELE/MED SURG/ONC  Daily Treatment Note  NAME: Zaid Ortega  : 1945  MRN: [id number]  Date of Service: 2024    Discharge Recommendations:  Subacute/Skilled Nursing Facility, 24 hour supervision or assist  OT Equipment Recommendations  Equipment Needed: No    AM-PAC score  AM-PAC Inpatient Daily Activity Raw Score: 20 (24)  AM-PAC Inpatient ADL T-Scale Score : 42.03 (24)  ADL Inpatient CMS 0-100% Score: 38.32 (24)  ADL Inpatient CMS G-Code Modifier : CJ (24)    Therapy discharge recommendations take into account each patient's current medical complexities and are subject to input/oversight from the patient's healthcare team.   Barriers to Home Discharge:   [] Steps to access home entry or bed/bath:   [x] Unable to transfer, ambulate, or propel wheelchair household distances without assist   [] Limited available assist at home upon discharge    [x] Patient or family requests d/c to post-acute facility    [x] Poor cognition/safety awareness for d/c to home alone    []Unable to maintain ordered weight bearing status    [] Patient with salient signs of long-standing immobility   [x] Patient is at risk for falls   [x] Other: Decreased safety and independence w/ ADLs.     If pt is unable to be seen after this session, please let this note serve as discharge summary.  Please see case management note for discharge disposition.  Thank you.    Patient Diagnosis(es): The primary encounter diagnosis was Confusion. A diagnosis of Encephalopathy was also pertinent to this visit.     Assessment    Assessment: Pt received for OT session resting in bed speaking to wife on phone to address current functional deficits that inhibit independence and safety with ADLs and functional mobility. Pt agreeable to session, reporting no pain. During session, pt completed bed mobility w/ SBA and increased time, sit<>stands w/ CGA, transfers 
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 Patient reoriented that he is in the hospital for treatment, responded \"I know\". Will continue to monitor.  
Physical Therapy  Facility/Department: [facility] C3 TELE/MED SURG/ONC  Daily Treatment Note  NAME: Zaid Ortega  : 1945  MRN: [id number]    Date of Service: 2024    Discharge Recommendations:  24 hour supervision or assist, Home with Home health PT   PT Equipment Recommendations  Equipment Needed: Yes  Mobility Devices: Walker  Walker: Rolling    Patient Diagnosis(es): The primary encounter diagnosis was Confusion. A diagnosis of Encephalopathy was also pertinent to this visit.    Assessment   Assessment: Pt progressed this date with therapy. Pt SBA for 50 ft gait with RW. Pt tolerated EOB exercises x15. Pt able to negotiate steps safely cga x1. Pt would continue to benefit from skilled PT to aid in the above deficits and a safe DC Home with 24 hr A when medically appropriate with HHPT.  Pt will benefit from a RW.  Activity Tolerance: Patient tolerated treatment well  Equipment Needed: Yes  Mobility Devices: Walker     Plan    Physical Therapy Plan  General Plan: 3-5 times per week  Specific Instructions for Next Treatment: progress mobility as tolerated  Current Treatment Recommendations: Strengthening;Balance training;Functional mobility training;Transfer training;Gait training;Endurance training;Cognitive reorientation;Neuromuscular re-education;Equipment evaluation, education, & procurement;Therapeutic activities;Co-Treatment;Safety education & training;Home exercise program;Stair training     Restrictions  Restrictions/Precautions  Restrictions/Precautions: Up as Tolerated, General Precautions, Isolation, Fall Risk, Contact Precautions  Required Braces or Orthoses?: No  Position Activity Restriction  Other position/activity restrictions: COVID isolation     Subjective    Subjective  Subjective: pt found in bed, RN cleared.  Pt family present near end of session.  Pain: denies pain at rest  Orientation  Overall Orientation Status: Within Functional Limits     Objective   Vitals  Vitals:    24 1003 
Physical Therapy  Facility/Department: [place] [address] TELE/MED SURG/ONC  Physical Therapy Initial Assessment/Treatment    Name: Zaid Ortega  : 1945  MRN: [id number]  Date of Service: 1/15/2024    Discharge Recommendations:  Subacute/Skilled Nursing Facility   PT Equipment Recommendations  Equipment Needed: No  Other: defer to facility      Therapy discharge recommendations take into account each patient's current medical complexities and are subject to input/oversight from the patient's healthcare team.   Barriers to Home Discharge:   [x] Steps to access home entry or bed/bath:   [x] Unable to transfer, ambulate, or propel wheelchair household distances without assist   [x] Limited available assist at home upon discharge    [] Patient or family requests d/c to post-acute facility    [x] Poor cognition/safety awareness for d/c to home alone    []Unable to maintain ordered weight bearing status    [] Patient with salient signs of long-standing immobility   [x] Patient is at risk for falls   [] Other:    If pt is unable to be seen after this session, please let this note serve as discharge summary.  Please see case management note for discharge disposition.  Thank you.      Patient Diagnosis(es): The primary encounter diagnosis was Confusion. A diagnosis of Encephalopathy was also pertinent to this visit.  Past Medical History:  has a past medical history of Dementia (HCC), Hyperlipidemia, Iron (Fe) deficiency anemia, and MGUS (monoclonal gammopathy of unknown significance).  Past Surgical History:  has a past surgical history that includes back surgery.    Assessment   Body Structures, Functions, Activity Limitations Requiring Skilled Therapeutic Intervention: Decreased functional mobility ;Decreased body mechanics;Decreased strength;Decreased cognition;Decreased safe awareness;Decreased coordination;Decreased balance;Decreased fine motor control;Decreased endurance;Decreased posture  Assessment: Pt seen for PT 
Pt assessment completed and charted. VSS. Pt a/ox4, can be forgetful. Pt denies pain. Pt tolerating diet, pt was able to feed self breakfast and tolerate.  Pt educated to call out for assistance, reinforced. Pt denies any other needs at this time.       Pt is a fall risk;  -Bed in lowest position and wheels locked.   -Call light within reach.   -Bedside table within reach.   -Non-skid footwear in place.  -bed check in place.   
Pt attempting to get out of bed to void. RN rounding saw pt trying to get up. RN assisted pt to bedside to help void in urnial pt only able to void 100ml. Pt bladder scanned. Pt had 187ml in bladder. Will continue to watch pt's out put and reassess if needed.    
Pt did not have a sitter when this RN took over pt. Sitter was discontinued overnight.  
Pt is alert and oriented with occasional forgetfulness. VSS- currently on RA with 92% saturation, needed 1L/min O2 while sleeping. Shift assessment completed. Tylenol given for headache. Denies other needs. Call light within reach, bed in lowest position, wheels locked. AVASYS in place for safety.  
Pt transported via MRI cart with transport and sitter to MRI.   
Pt wanting to get out of bed more and not willing to get back in at times. Pt more agitated that staff will not let him ambulate on own. Pt became anxious, agitated and slightly aggressive. Pt reported he wanted to be left alone, but pt would not remain in bed. Rn paged MD for sitter. Sitter initiated. Pt also notified MD that pt was peeing frequently but small amounts. Pt bladder scanned and 187 in bladder. MD started Flomax. Also due to agitation MD started Seroquel which was going to be initiated at d/c as well.   
Pt's verbal and written discharge instructions given, all questions answered. IV removed. Follow up appointments discussed. New medications reviewed. Pt left in stable condition via wheelchair to private car with family.   
Pt. Resting in bed. Alert/oriented to self and somewhat situation\place only.  Vitals and assessment stable as charted.  O2 sat ranging from 84-92% on room air.  Using incentive spirometer helped but only briefly then pt desaturated to 87-88 again.  Placed on 2L initially and then up to 3 to maintain >90%; MD notified.  Noted mild rhonchi through left sided lungs.  Oral temp 100.4 this AM; administered 650 mg tylenol PO as per prn order.  Denies any pain/nausea or any other discomfort at present time.  Call light in reach.  Avasys monitor on.  Sitter remains to bedside as pt is high fall risk and not following directions to stay in bed.  He was apparently combative over night but pleasant overall this morning.  Will continue to monitor.      Reviewed incentive spirometry with patient.  Education on reason for use.  Instructed patient how to use incentive spirometer.  Pt demonstrated understanding of use.  Encouraged frequent use of I\S while patient is awake.    
RN tells me he is more confused and leaning to the L after his fall.  Will get a CT to r/o bleed, will now pursue the MRI to r/o CVA (will also get MRI with contrast to r/o tumors even though this isn't particularly suspected).  
Received patient from ED transferred to room 339. Patient is alert oriented, VSS. Initial assessment done as charted. Non skid foot ware in place. Bed alarm on and in lowest position. Will monitor.  
Returned from MRI.  
Sitter rang out asking writer for help. Sitter stated that he was kicking her, pt stated that did not happen and \"if I wanted to I would\" to the sitter. Dr Dennis MD paged for possible sedative. Pt attempted to get OOB to leave the room. Informed patient that he had covid, patient stated \"oh now I have covid too? Do you expect me to believe that?\" Pointed out writer and sitter wearing  gowns and masks to patient. Asked pt if he would want to call his wife so she could validate what was being told. Pt grabbed his phone and laid back down. Patient started to become calm. Benadryl 12.5 IV ordered. Medication administered by RN.   
Spoke to pt's wife. Wife reported that onset of confusion was minimal 3 years ago. Then in the last three weeks it had gotten worse. But in the last 48 hrs it was even worse. She reported that yesterday was the worse that is why she had brought him to the ED. Wife also reported that pt has had falls at home.   
Week: 3-5x/week  Current Treatment Recommendations: Strengthening;ROM;Patient/Caregiver education & training;Self-Care / ADL;Functional mobility training;Safety education & training;Endurance training;Cognitive/Perceptual training;Equipment evaluation, education, & procurement;Positioning     Restrictions  Restrictions/Precautions  Restrictions/Precautions: Up as Tolerated;General Precautions;Isolation;Fall Risk;Contact Precautions  Required Braces or Orthoses?: No  Position Activity Restriction  Other position/activity restrictions: COVID isolation    Subjective   Subjective  Subjective: Pt in chair at OT arrival. Pt is very pleasant. Reports needing to use restroom. RN cleared pt to participate  Pain: Denies pain at rest.         Objective    Vitals  Vitals  Pulse: 66  BP: (!) 145/73  MAP (Calculated): 97  SpO2: 91 %  Bed Mobility Training  Bed Mobility Training: No  Balance  Sitting: Intact  Standing: Impaired (CGA with RW to amb to bathroom)  Transfer Training  Transfer Training: Yes  Overall Level of Assistance: Contact-guard assistance  Interventions: Safety awareness training;Verbal cues  Sit to Stand: Adaptive equipment;Additional time;Stand-by assistance  Stand to Sit: Adaptive equipment;Additional time  Toilet Transfer: Adaptive equipment;Additional time;Contact-guard assistance (CGA for descent, SBA for ascent)     ADL  Toileting: Increased time to complete;Adaptive equipment;Stand by assistance  Toileting Skilled Clinical Factors: SBA to manage pants to/from waist  OT Exercises  A/AROM Exercises: x 20 bicep curls, chest press, IR/ER, supination/pronation, horizontal abd/add; x 10 STS from recliner with SBA     Safety Devices  Type of Devices: Nurse notified;Gait belt;All fall risk precautions in place;Call light within reach;Patient at risk for falls;Chair alarm in place;Left in chair  Restraints  Restraints Initially in Place: No     Patient Education  Education Given To: Patient  Education Provided: 
consulted to admit this patient for ongoing workup of acute confusion.     Assessment/Plan:      Current Principal Problem:  Altered mental state    COVID 19  - completed steroids  - weaned to room air  - supportive care    Acute metabolic encephalopathy  - 2/2 aobve  - MRI brain negative  - no longer demonstrating any unsafe behaviors. Clinically seems resolved  - supportive care    Obstructive uropathy  - continue flomax  - follow up with urology as outpatient    Physical Exam Performed:      General appearance: No apparent distress, appears stated age.  HEENT: Pupils equal, round.  Conjunctivae/corneas clear.  Neck: No jugular venous distention.   Respiratory:  Normal respiratory effort.  Bilaterally without Rales/Wheezes/Rhonchi.  Rattling cough has resolved.  Cardiovascular: Normal rate and regular rhythm with normal S1/S2 without murmurs, rubs or gallops.  Abdomen: Soft, non-tender, non-distended with normal bowel sounds.  Musculoskeletal: No cyanosis bilaterally.  No edema.  Without deformity.  Skin: No jaundice.  No rashes or lesions.  Neurologic:  Neurovascularly intact without any focal sensory/motor deficits. Cranial nerves: II-XII intact, grossly non-focal.  Psychiatric: Alert and oriented    BP (!) 148/71   Pulse 66   Temp 98.5 °F (36.9 °C) (Oral)   Resp 18   Ht 1.727 m (5' 8\")   Wt 81 kg (178 lb 9.2 oz)   SpO2 93%   BMI 27.15 kg/m²     Diet: ADULT DIET; Regular  DVT Prophylaxis: [x]PPx LMWH  []SQ Heparin  []IPC/SCDs  []Eliquis  []Xarelto  []Coumadin  []Other -      Code status: Limited  PT/OT Eval Status:   []NOT yet ordered  []Ordered and Pending   [x]Seen with Recommendations for:  []Home independently  []Home w/ assist  []HHC  [x]SNF  []Acute Rehab    Anticipated Discharge Day/Date:  pending SNF approval. No medical barriers to discharge    Anticipated Discharge Location: [x]Home  []HHC  []SNF  []Acute Rehab  []ECF  []LTAC  []Hospice  []Other -      Consults:      IP CONSULT TO HOME CARE 
consulted to admit this patient for ongoing workup of acute confusion.     Assessment/Plan:      Current Principal Problem:  Altered mental state    COVID 19  - completed steroids today  - weaned to room air  - supportive care    Acute metabolic encephalopathy  - 2/2 aobve  - MRI brain negative  - no longer demonstrating any unsafe behaviors. Clinically seems resolved  - supportive care    Obstructive uropathy  - continue flomax  - follow up with urology as outpatient    Physical Exam Performed:      General appearance: No apparent distress, appears stated age.  HEENT: Pupils equal, round.  Conjunctivae/corneas clear.  Neck: No jugular venous distention.   Respiratory:  Normal respiratory effort.  Bilaterally without Rales/Wheezes/Rhonchi.  Rattling cough has resolved.  Cardiovascular: Normal rate and regular rhythm with normal S1/S2 without murmurs, rubs or gallops.  Abdomen: Soft, non-tender, non-distended with normal bowel sounds.  Musculoskeletal: No cyanosis bilaterally.  No edema.  Without deformity.  Skin: No jaundice.  No rashes or lesions.  Neurologic:  Neurovascularly intact without any focal sensory/motor deficits. Cranial nerves: II-XII intact, grossly non-focal.  Psychiatric: Alert and oriented    /80   Pulse 69   Temp 97.9 °F (36.6 °C) (Oral)   Resp 16   Ht 1.727 m (5' 8\")   Wt 81 kg (178 lb 9.2 oz)   SpO2 92%   BMI 27.15 kg/m²     Diet: ADULT DIET; Regular  DVT Prophylaxis: [x]PPx LMWH  []SQ Heparin  []IPC/SCDs  []Eliquis  []Xarelto  []Coumadin  []Other -      Code status: Limited  PT/OT Eval Status:   []NOT yet ordered  []Ordered and Pending   [x]Seen with Recommendations for:  []Home independently  []Home w/ assist  []HHC  [x]SNF  []Acute Rehab    Anticipated Discharge Day/Date:  pending SNF approval. No medical barriers to discharge    Anticipated Discharge Location: [x]Home  []HHC  []SNF  []Acute Rehab  []ECF  []LTAC  []Hospice  []Other -      Consults:      IP CONSULT TO HOME CARE 
patient for ongoing workup of acute confusion.     Assessment/Plan:      Current Principal Problem:  Altered mental state    COVID 19  - completed steroids  - weaned to room air  - supportive care    Acute metabolic encephalopathy  - 2/2 above  - MRI brain negative  - no longer demonstrating any unsafe behaviors. Clinically seems resolved  - supportive care    Obstructive uropathy  - continue flomax  - follow up with urology as outpatient    Physical Exam Performed:      General appearance: No apparent distress, appears stated age.  HEENT: Pupils equal, round.  Conjunctivae/corneas clear.  Neck: No jugular venous distention.   Respiratory:  Normal respiratory effort.  Bilaterally without Rales/Wheezes/Rhonchi.  Rattling cough has resolved.  Cardiovascular: Normal rate and regular rhythm with normal S1/S2 without murmurs, rubs or gallops.  Abdomen: Soft, non-tender, non-distended with normal bowel sounds.  Musculoskeletal: No cyanosis bilaterally.  No edema.  Without deformity.  Skin: No jaundice.  No rashes or lesions.  Neurologic:  Neurovascularly intact without any focal sensory/motor deficits. Cranial nerves: II-XII intact, grossly non-focal.  Psychiatric: Alert and oriented    BP (!) 153/83   Pulse 77   Temp 97.8 °F (36.6 °C) (Oral)   Resp 18   Ht 1.727 m (5' 8\")   Wt 81 kg (178 lb 9.2 oz)   SpO2 95%   BMI 27.15 kg/m²     Diet: ADULT DIET; Regular  DVT Prophylaxis: [x]PPx LMWH  []SQ Heparin  []IPC/SCDs  []Eliquis  []Xarelto  []Coumadin  []Other -      Code status: Limited  PT/OT Eval Status:   []NOT yet ordered  []Ordered and Pending   [x]Seen with Recommendations for:  []Home independently  []Home w/ assist  []HHC  [x]SNF  []Acute Rehab    Anticipated Discharge Day/Date:  Cert denied. AMPAC too high to qualify for SNF. Recommend HHC.     Anticipated Discharge Location: []Home  [x]HHC  []SNF  []Acute Rehab  []ECF  []LTAC  []Hospice  []Other -      Consults:      IP CONSULT TO HOME CARE NEEDS  IP CONSULT 
CONSULT TO HOME CARE NEEDS  IP CONSULT TO CASE MANAGEMENT  IP CONSULT TO SPIRITUAL SERVICES      This patient has a high likelihood of being discharged tomorrow and is appropriate for A1 Discharge Unit in AM pending clinical course overnight: []Yes  [x]No    ------------------------------------------------------------------------------------------------------------------------------------------------------------------------    MDM      Moderate    Medications:  Personally reviewed in detail in conjunction w/ labs as documented for evidence of drug toxicity.     Infusion Medications    sodium chloride       Scheduled Medications    dexAMETHasone  6 mg Oral Daily    enoxaparin  40 mg SubCUTAneous Daily    tamsulosin  0.4 mg Oral Daily    sodium chloride flush  5-40 mL IntraVENous 2 times per day     PRN Meds: diphenhydrAMINE, guaiFENesin-dextromethorphan, QUEtiapine, sodium chloride flush, sodium chloride, potassium chloride **OR** potassium alternative oral replacement **OR** potassium chloride, magnesium sulfate, ondansetron **OR** ondansetron, polyethylene glycol, acetaminophen **OR** acetaminophen     Labs:  Personally reviewed and interpreted for clinical significance.     No results for input(s): \"WBC\", \"HGB\", \"HCT\", \"PLT\" in the last 72 hours.  No results for input(s): \"NA\", \"K\", \"CL\", \"CO2\", \"BUN\", \"CREATININE\", \"CALCIUM\", \"MG\", \"PHOS\" in the last 72 hours.  No results for input(s): \"PROBNP\", \"TROPHS\" in the last 72 hours.  No results for input(s): \"LABA1C\" in the last 72 hours.  No results for input(s): \"AST\", \"ALT\", \"BILIDIR\", \"BILITOT\", \"ALKPHOS\" in the last 72 hours.  No results for input(s): \"INR\", \"LACTA\", \"TSH\" in the last 72 hours.    Urine Cultures: No results found for: \"LABURIN\"  Blood Cultures: No results found for: \"BC\"  No results found for: \"BLOODCULT2\"  Organism: No results found for: \"ORG\"      Maciel Ku MD    
will navigate 8 steps with BHR and CGA by 1/22  Patient Goals   Patient Goals : \"to go home\"    Education  Patient Education  Education Given To: Patient  Education Provided: Plan of Care;Role of Therapy;Family Education;Transfer Training;Home Exercise Program;Equipment  Education Provided Comments: HEP  Education Method: Verbal  Barriers to Learning: Cognition  Education Outcome: Verbalized understanding;Continued education needed    AM-PAC - Mobility    AM-PAC Basic Mobility - Inpatient   How much help is needed turning from your back to your side while in a flat bed without using bedrails?: None  How much help is needed moving from lying on your back to sitting on the side of a flat bed without using bedrails?: None  How much help is needed moving to and from a bed to a chair?: A Little  How much help is needed standing up from a chair using your arms?: A Little  How much help is needed walking in hospital room?: A Little  How much help is needed climbing 3-5 steps with a railing?: A Lot  AM-PAC Inpatient Mobility Raw Score : 19  AM-PAC Inpatient T-Scale Score : 45.44  Mobility Inpatient CMS 0-100% Score: 41.77  Mobility Inpatient CMS G-Code Modifier : CK         Therapy Time   Individual Concurrent Group Co-treatment   Time In     1349     Time Out     1415     Minutes     26     Timed Code Treatment Minutes: 26 Minutes       Kimberly Galicia PT           
eating meals?: None  AM-PAC Inpatient Daily Activity Raw Score: 20  AM-PAC Inpatient ADL T-Scale Score : 42.03  ADL Inpatient CMS 0-100% Score: 38.32  ADL Inpatient CMS G-Code Modifier : CJ    Therapy Time   Individual Concurrent Group Co-treatment   Time In 0958         Time Out 1106         Minutes 68         Timed Code Treatment Minutes: 66 Minutes     Danielle Pardo, OT     
    Signature:  Jada Payne M.S. CCC-SLP  Speech-language pathologist  [id number]

## 2024-01-22 NOTE — CARE COORDINATION
Spoke with Mirian YUN and Dr Daugherty. Per Mirian patient is more forgetful than weak. Able to ambulate, stand at sink for grooming, dress self. Spoke with Dr Daugherty, will not seek P2P with current physical status as reported. Spoke with wife via phone. Stated she has concerns for taking him home. He has a flight of stairs to get to basement where his TV is and a flight of stairs to get to bedroom. She states there is nowhere for his to sleep on main level, no recliner or couch. Stated she would be in at 1230. Requested therapy work with steps with him at that time. Per Ray, needs to be 10 days prior to working with him out of room. Ignacio Torres RN  Spoke with Katina at [facility], requested front loaded therapy at d/c. Ignacio Torres RN

## 2024-01-22 NOTE — CARE COORDINATION
[facility]    DC order noted, all docs needed have been faxed to [facility] for home care services.    Home care to see patient within 24-48 hrs    Katina Gaona RN, BSN CTN  [facility] [phone]

## 2024-01-22 NOTE — CARE COORDINATION
Chart reviewed. Patient medically ready for d/c. Call to [facility] to check status of cert. Waiting return call. Ignacio Torres RN

## 2025-02-09 ENCOUNTER — HOSPITAL ENCOUNTER (INPATIENT)
Age: 80
LOS: 6 days | Discharge: NURSING, REHAB OR LONG-TERM CARE | DRG: 641 | End: 2025-02-16
Attending: STUDENT IN AN ORGANIZED HEALTH CARE EDUCATION/TRAINING PROGRAM | Admitting: STUDENT IN AN ORGANIZED HEALTH CARE EDUCATION/TRAINING PROGRAM
Payer: MEDICARE

## 2025-02-09 ENCOUNTER — APPOINTMENT (OUTPATIENT)
Dept: GENERAL RADIOLOGY | Age: 80
DRG: 641 | End: 2025-02-09
Payer: MEDICARE

## 2025-02-09 DIAGNOSIS — R53.83 FATIGUE, UNSPECIFIED TYPE: ICD-10-CM

## 2025-02-09 DIAGNOSIS — R05.1 ACUTE COUGH: ICD-10-CM

## 2025-02-09 DIAGNOSIS — R79.89 ELEVATED TROPONIN: Primary | ICD-10-CM

## 2025-02-09 DIAGNOSIS — Z78.9 UNABLE TO CARE FOR SELF: ICD-10-CM

## 2025-02-09 LAB
ALBUMIN SERPL-MCNC: 4 G/DL (ref 3.4–5)
ALBUMIN/GLOB SERPL: 1.4 {RATIO} (ref 1.1–2.2)
ALP SERPL-CCNC: 60 U/L (ref 40–129)
ALT SERPL-CCNC: 14 U/L (ref 10–40)
ANION GAP SERPL CALCULATED.3IONS-SCNC: 12 MMOL/L (ref 3–16)
AST SERPL-CCNC: 25 U/L (ref 15–37)
BACTERIA URNS QL MICRO: ABNORMAL /HPF
BASOPHILS # BLD: 0 K/UL (ref 0–0.2)
BASOPHILS NFR BLD: 0.5 %
BILIRUB SERPL-MCNC: 0.4 MG/DL (ref 0–1)
BILIRUB UR QL STRIP.AUTO: NEGATIVE
BUN SERPL-MCNC: 29 MG/DL (ref 7–20)
CALCIUM SERPL-MCNC: 9.9 MG/DL (ref 8.3–10.6)
CHLORIDE SERPL-SCNC: 105 MMOL/L (ref 99–110)
CLARITY UR: CLEAR
CO2 SERPL-SCNC: 22 MMOL/L (ref 21–32)
COLOR UR: YELLOW
CREAT SERPL-MCNC: 1.2 MG/DL (ref 0.8–1.3)
DEPRECATED RDW RBC AUTO: 13.5 % (ref 12.4–15.4)
EOSINOPHIL # BLD: 0 K/UL (ref 0–0.6)
EOSINOPHIL NFR BLD: 0.1 %
FLUAV RNA RESP QL NAA+PROBE: NOT DETECTED
FLUBV RNA RESP QL NAA+PROBE: NOT DETECTED
GFR SERPLBLD CREATININE-BSD FMLA CKD-EPI: 61 ML/MIN/{1.73_M2}
GLUCOSE SERPL-MCNC: 151 MG/DL (ref 70–99)
GLUCOSE UR STRIP.AUTO-MCNC: NEGATIVE MG/DL
HCT VFR BLD AUTO: 39.3 % (ref 40.5–52.5)
HGB BLD-MCNC: 13.3 G/DL (ref 13.5–17.5)
HGB UR QL STRIP.AUTO: ABNORMAL
KETONES UR STRIP.AUTO-MCNC: NEGATIVE MG/DL
LEUKOCYTE ESTERASE UR QL STRIP.AUTO: NEGATIVE
LYMPHOCYTES # BLD: 1.3 K/UL (ref 1–5.1)
LYMPHOCYTES NFR BLD: 13.2 %
MCH RBC QN AUTO: 32.4 PG (ref 26–34)
MCHC RBC AUTO-ENTMCNC: 33.9 G/DL (ref 31–36)
MCV RBC AUTO: 95.5 FL (ref 80–100)
MONOCYTES # BLD: 0.8 K/UL (ref 0–1.3)
MONOCYTES NFR BLD: 8.1 %
MUCOUS THREADS #/AREA URNS LPF: ABNORMAL /LPF
NEUTROPHILS # BLD: 7.5 K/UL (ref 1.7–7.7)
NEUTROPHILS NFR BLD: 78.1 %
NITRITE UR QL STRIP.AUTO: NEGATIVE
NT-PROBNP SERPL-MCNC: 168 PG/ML (ref 0–449)
PH UR STRIP.AUTO: 5.5 [PH] (ref 5–8)
PLATELET # BLD AUTO: 172 K/UL (ref 135–450)
PMV BLD AUTO: 8 FL (ref 5–10.5)
POTASSIUM SERPL-SCNC: 4 MMOL/L (ref 3.5–5.1)
PROT SERPL-MCNC: 6.8 G/DL (ref 6.4–8.2)
PROT UR STRIP.AUTO-MCNC: 30 MG/DL
RBC # BLD AUTO: 4.12 M/UL (ref 4.2–5.9)
RBC #/AREA URNS HPF: ABNORMAL /HPF (ref 0–4)
SARS-COV-2 RNA RESP QL NAA+PROBE: NOT DETECTED
SODIUM SERPL-SCNC: 139 MMOL/L (ref 136–145)
SP GR UR STRIP.AUTO: >=1.03 (ref 1–1.03)
TROPONIN, HIGH SENSITIVITY: 35 NG/L (ref 0–22)
TROPONIN, HIGH SENSITIVITY: 39 NG/L (ref 0–22)
UA COMPLETE W REFLEX CULTURE PNL UR: ABNORMAL
UA DIPSTICK W REFLEX MICRO PNL UR: YES
URN SPEC COLLECT METH UR: ABNORMAL
UROBILINOGEN UR STRIP-ACNC: 0.2 E.U./DL
WBC # BLD AUTO: 9.6 K/UL (ref 4–11)
WBC #/AREA URNS HPF: ABNORMAL /HPF (ref 0–5)

## 2025-02-09 PROCEDURE — 36415 COLL VENOUS BLD VENIPUNCTURE: CPT

## 2025-02-09 PROCEDURE — 80053 COMPREHEN METABOLIC PANEL: CPT

## 2025-02-09 PROCEDURE — 71045 X-RAY EXAM CHEST 1 VIEW: CPT

## 2025-02-09 PROCEDURE — 83880 ASSAY OF NATRIURETIC PEPTIDE: CPT

## 2025-02-09 PROCEDURE — 99285 EMERGENCY DEPT VISIT HI MDM: CPT

## 2025-02-09 PROCEDURE — 87636 SARSCOV2 & INF A&B AMP PRB: CPT

## 2025-02-09 PROCEDURE — 81001 URINALYSIS AUTO W/SCOPE: CPT

## 2025-02-09 PROCEDURE — 93005 ELECTROCARDIOGRAM TRACING: CPT

## 2025-02-09 PROCEDURE — 2580000003 HC RX 258

## 2025-02-09 PROCEDURE — 84484 ASSAY OF TROPONIN QUANT: CPT

## 2025-02-09 PROCEDURE — 85025 COMPLETE CBC W/AUTO DIFF WBC: CPT

## 2025-02-09 RX ORDER — KETOROLAC TROMETHAMINE 30 MG/ML
15 INJECTION, SOLUTION INTRAMUSCULAR; INTRAVENOUS ONCE
Status: DISPENSED | OUTPATIENT
Start: 2025-02-09 | End: 2025-02-14

## 2025-02-09 RX ORDER — ONDANSETRON 2 MG/ML
4 INJECTION INTRAMUSCULAR; INTRAVENOUS ONCE
Status: DISCONTINUED | OUTPATIENT
Start: 2025-02-09 | End: 2025-02-16 | Stop reason: HOSPADM

## 2025-02-09 RX ORDER — SERTRALINE HYDROCHLORIDE 25 MG/1
25 TABLET, FILM COATED ORAL
Status: ON HOLD | COMMUNITY
Start: 2024-08-23 | End: 2025-02-16 | Stop reason: HOSPADM

## 2025-02-09 RX ORDER — 0.9 % SODIUM CHLORIDE 0.9 %
1000 INTRAVENOUS SOLUTION INTRAVENOUS ONCE
Status: COMPLETED | OUTPATIENT
Start: 2025-02-09 | End: 2025-02-10

## 2025-02-09 RX ADMIN — SODIUM CHLORIDE 1000 ML: 0.9 INJECTION, SOLUTION INTRAVENOUS at 23:22

## 2025-02-10 PROBLEM — R62.7 FAILURE TO THRIVE IN ADULT: Status: ACTIVE | Noted: 2025-02-10

## 2025-02-10 LAB
ANION GAP SERPL CALCULATED.3IONS-SCNC: 12 MMOL/L (ref 3–16)
BASOPHILS # BLD: 0 K/UL (ref 0–0.2)
BASOPHILS NFR BLD: 0.2 %
BUN SERPL-MCNC: 27 MG/DL (ref 7–20)
CALCIUM SERPL-MCNC: 9.4 MG/DL (ref 8.3–10.6)
CHLORIDE SERPL-SCNC: 105 MMOL/L (ref 99–110)
CO2 SERPL-SCNC: 23 MMOL/L (ref 21–32)
CREAT SERPL-MCNC: 0.8 MG/DL (ref 0.8–1.3)
DEPRECATED RDW RBC AUTO: 13.4 % (ref 12.4–15.4)
EKG ATRIAL RATE: 77 BPM
EKG DIAGNOSIS: NORMAL
EKG P AXIS: 50 DEGREES
EKG P-R INTERVAL: 176 MS
EKG Q-T INTERVAL: 340 MS
EKG QRS DURATION: 80 MS
EKG QTC CALCULATION (BAZETT): 384 MS
EKG R AXIS: 55 DEGREES
EKG T AXIS: 37 DEGREES
EKG VENTRICULAR RATE: 77 BPM
EOSINOPHIL # BLD: 0 K/UL (ref 0–0.6)
EOSINOPHIL NFR BLD: 0.3 %
GFR SERPLBLD CREATININE-BSD FMLA CKD-EPI: 89 ML/MIN/{1.73_M2}
GLUCOSE SERPL-MCNC: 118 MG/DL (ref 70–99)
HCT VFR BLD AUTO: 39.1 % (ref 40.5–52.5)
HGB BLD-MCNC: 13.2 G/DL (ref 13.5–17.5)
LACTATE BLDV-SCNC: 0.7 MMOL/L (ref 0.4–2)
LYMPHOCYTES # BLD: 0.9 K/UL (ref 1–5.1)
LYMPHOCYTES NFR BLD: 10.4 %
MCH RBC QN AUTO: 32.3 PG (ref 26–34)
MCHC RBC AUTO-ENTMCNC: 33.6 G/DL (ref 31–36)
MCV RBC AUTO: 96.2 FL (ref 80–100)
MONOCYTES # BLD: 0.4 K/UL (ref 0–1.3)
MONOCYTES NFR BLD: 4.9 %
NEUTROPHILS # BLD: 7.6 K/UL (ref 1.7–7.7)
NEUTROPHILS NFR BLD: 84.2 %
PLATELET # BLD AUTO: 207 K/UL (ref 135–450)
PMV BLD AUTO: 7.2 FL (ref 5–10.5)
POTASSIUM SERPL-SCNC: 3.6 MMOL/L (ref 3.5–5.1)
PROCALCITONIN SERPL IA-MCNC: 0.15 NG/ML (ref 0–0.15)
RBC # BLD AUTO: 4.07 M/UL (ref 4.2–5.9)
SODIUM SERPL-SCNC: 140 MMOL/L (ref 136–145)
TROPONIN, HIGH SENSITIVITY: 34 NG/L (ref 0–22)
TROPONIN, HIGH SENSITIVITY: 36 NG/L (ref 0–22)
WBC # BLD AUTO: 9 K/UL (ref 4–11)

## 2025-02-10 PROCEDURE — 36415 COLL VENOUS BLD VENIPUNCTURE: CPT

## 2025-02-10 PROCEDURE — 6370000000 HC RX 637 (ALT 250 FOR IP): Performed by: NURSE PRACTITIONER

## 2025-02-10 PROCEDURE — 6360000002 HC RX W HCPCS: Performed by: NURSE PRACTITIONER

## 2025-02-10 PROCEDURE — 83605 ASSAY OF LACTIC ACID: CPT

## 2025-02-10 PROCEDURE — 80048 BASIC METABOLIC PNL TOTAL CA: CPT

## 2025-02-10 PROCEDURE — 6370000000 HC RX 637 (ALT 250 FOR IP)

## 2025-02-10 PROCEDURE — 6370000000 HC RX 637 (ALT 250 FOR IP): Performed by: STUDENT IN AN ORGANIZED HEALTH CARE EDUCATION/TRAINING PROGRAM

## 2025-02-10 PROCEDURE — 1200000000 HC SEMI PRIVATE

## 2025-02-10 PROCEDURE — 84484 ASSAY OF TROPONIN QUANT: CPT

## 2025-02-10 PROCEDURE — 2500000003 HC RX 250 WO HCPCS: Performed by: NURSE PRACTITIONER

## 2025-02-10 PROCEDURE — 2580000003 HC RX 258: Performed by: NURSE PRACTITIONER

## 2025-02-10 PROCEDURE — 93010 ELECTROCARDIOGRAM REPORT: CPT | Performed by: INTERNAL MEDICINE

## 2025-02-10 PROCEDURE — 85025 COMPLETE CBC W/AUTO DIFF WBC: CPT

## 2025-02-10 PROCEDURE — 84145 PROCALCITONIN (PCT): CPT

## 2025-02-10 PROCEDURE — 6360000002 HC RX W HCPCS

## 2025-02-10 PROCEDURE — 2500000003 HC RX 250 WO HCPCS

## 2025-02-10 RX ORDER — ACETAMINOPHEN 325 MG/1
650 TABLET ORAL EVERY 6 HOURS PRN
Status: DISCONTINUED | OUTPATIENT
Start: 2025-02-10 | End: 2025-02-16 | Stop reason: HOSPADM

## 2025-02-10 RX ORDER — ATORVASTATIN CALCIUM 10 MG/1
20 TABLET, FILM COATED ORAL DAILY
Status: DISCONTINUED | OUTPATIENT
Start: 2025-02-10 | End: 2025-02-16 | Stop reason: HOSPADM

## 2025-02-10 RX ORDER — SENNOSIDES 8.6 MG
650 CAPSULE ORAL EVERY 8 HOURS SCHEDULED
Status: DISCONTINUED | OUTPATIENT
Start: 2025-02-10 | End: 2025-02-10

## 2025-02-10 RX ORDER — QUETIAPINE FUMARATE 25 MG/1
25 TABLET, FILM COATED ORAL NIGHTLY PRN
Status: DISCONTINUED | OUTPATIENT
Start: 2025-02-10 | End: 2025-02-10

## 2025-02-10 RX ORDER — QUETIAPINE FUMARATE 25 MG/1
25 TABLET, FILM COATED ORAL PRN
Status: DISCONTINUED | OUTPATIENT
Start: 2025-02-10 | End: 2025-02-10

## 2025-02-10 RX ORDER — SODIUM CHLORIDE 9 MG/ML
INJECTION, SOLUTION INTRAVENOUS CONTINUOUS
Status: ACTIVE | OUTPATIENT
Start: 2025-02-10 | End: 2025-02-10

## 2025-02-10 RX ORDER — SODIUM CHLORIDE 0.9 % (FLUSH) 0.9 %
5-40 SYRINGE (ML) INJECTION PRN
Status: DISCONTINUED | OUTPATIENT
Start: 2025-02-10 | End: 2025-02-16 | Stop reason: HOSPADM

## 2025-02-10 RX ORDER — ONDANSETRON 4 MG/1
4 TABLET, ORALLY DISINTEGRATING ORAL EVERY 8 HOURS PRN
Status: DISCONTINUED | OUTPATIENT
Start: 2025-02-10 | End: 2025-02-16 | Stop reason: HOSPADM

## 2025-02-10 RX ORDER — ASPIRIN 325 MG
325 TABLET ORAL ONCE
Status: COMPLETED | OUTPATIENT
Start: 2025-02-10 | End: 2025-02-10

## 2025-02-10 RX ORDER — TAMSULOSIN HYDROCHLORIDE 0.4 MG/1
0.4 CAPSULE ORAL DAILY
Status: DISCONTINUED | OUTPATIENT
Start: 2025-02-10 | End: 2025-02-16 | Stop reason: HOSPADM

## 2025-02-10 RX ORDER — QUETIAPINE FUMARATE 25 MG/1
25 TABLET, FILM COATED ORAL EVERY 6 HOURS PRN
Status: DISCONTINUED | OUTPATIENT
Start: 2025-02-10 | End: 2025-02-12

## 2025-02-10 RX ORDER — QUETIAPINE FUMARATE 25 MG/1
25 TABLET, FILM COATED ORAL NIGHTLY PRN
Status: CANCELLED | OUTPATIENT
Start: 2025-02-10

## 2025-02-10 RX ORDER — OLANZAPINE 10 MG/2ML
5 INJECTION, POWDER, FOR SOLUTION INTRAMUSCULAR ONCE
Status: COMPLETED | OUTPATIENT
Start: 2025-02-10 | End: 2025-02-10

## 2025-02-10 RX ORDER — SODIUM CHLORIDE 9 MG/ML
INJECTION, SOLUTION INTRAVENOUS PRN
Status: DISCONTINUED | OUTPATIENT
Start: 2025-02-10 | End: 2025-02-16 | Stop reason: HOSPADM

## 2025-02-10 RX ORDER — ENOXAPARIN SODIUM 100 MG/ML
40 INJECTION SUBCUTANEOUS DAILY
Status: DISCONTINUED | OUTPATIENT
Start: 2025-02-10 | End: 2025-02-16 | Stop reason: HOSPADM

## 2025-02-10 RX ORDER — POLYETHYLENE GLYCOL 3350 17 G/17G
17 POWDER, FOR SOLUTION ORAL DAILY PRN
Status: DISCONTINUED | OUTPATIENT
Start: 2025-02-10 | End: 2025-02-16 | Stop reason: HOSPADM

## 2025-02-10 RX ORDER — SODIUM CHLORIDE 0.9 % (FLUSH) 0.9 %
5-40 SYRINGE (ML) INJECTION EVERY 12 HOURS SCHEDULED
Status: DISCONTINUED | OUTPATIENT
Start: 2025-02-10 | End: 2025-02-16 | Stop reason: HOSPADM

## 2025-02-10 RX ORDER — ACETAMINOPHEN 650 MG/1
650 SUPPOSITORY RECTAL EVERY 6 HOURS PRN
Status: DISCONTINUED | OUTPATIENT
Start: 2025-02-10 | End: 2025-02-16 | Stop reason: HOSPADM

## 2025-02-10 RX ORDER — MECOBALAMIN 5000 MCG
5 TABLET,DISINTEGRATING ORAL NIGHTLY
Status: DISCONTINUED | OUTPATIENT
Start: 2025-02-10 | End: 2025-02-13

## 2025-02-10 RX ORDER — ONDANSETRON 2 MG/ML
4 INJECTION INTRAMUSCULAR; INTRAVENOUS EVERY 6 HOURS PRN
Status: DISCONTINUED | OUTPATIENT
Start: 2025-02-10 | End: 2025-02-16 | Stop reason: HOSPADM

## 2025-02-10 RX ORDER — QUETIAPINE FUMARATE 25 MG/1
25 TABLET, FILM COATED ORAL NIGHTLY
Status: DISCONTINUED | OUTPATIENT
Start: 2025-02-10 | End: 2025-02-11

## 2025-02-10 RX ADMIN — SERTRALINE 25 MG: 50 TABLET, FILM COATED ORAL at 18:22

## 2025-02-10 RX ADMIN — ZIPRASIDONE MESYLATE 10 MG: 20 INJECTION, POWDER, LYOPHILIZED, FOR SOLUTION INTRAMUSCULAR at 12:44

## 2025-02-10 RX ADMIN — SODIUM CHLORIDE: 9 INJECTION, SOLUTION INTRAVENOUS at 02:50

## 2025-02-10 RX ADMIN — Medication 5 MG: at 22:05

## 2025-02-10 RX ADMIN — SODIUM CHLORIDE, PRESERVATIVE FREE 10 ML: 5 INJECTION INTRAVENOUS at 22:05

## 2025-02-10 RX ADMIN — TAMSULOSIN HYDROCHLORIDE 0.4 MG: 0.4 CAPSULE ORAL at 09:16

## 2025-02-10 RX ADMIN — ASPIRIN 325 MG: 325 TABLET ORAL at 00:20

## 2025-02-10 RX ADMIN — OLANZAPINE 5 MG: 10 INJECTION, POWDER, FOR SOLUTION INTRAMUSCULAR at 19:55

## 2025-02-10 RX ADMIN — ENOXAPARIN SODIUM 40 MG: 100 INJECTION SUBCUTANEOUS at 10:42

## 2025-02-10 RX ADMIN — QUETIAPINE FUMARATE 25 MG: 25 TABLET ORAL at 18:23

## 2025-02-10 RX ADMIN — ATORVASTATIN CALCIUM 20 MG: 10 TABLET, FILM COATED ORAL at 22:05

## 2025-02-10 NOTE — H&P
Hospital Medicine History & Physical      Date of Admission: 2/9/2025    Date of Service:  Pt seen/examined on 2/10/2025     [x]Admitted to Inpatient with expected LOS greater than two midnights due to medical therapy.    []Placed in Observation status.    Chief Admission Complaint:  fever, cough, poor appetite and congestion    Presenting Admission History:      79 y.o. male, with PMH of dementia, who presented to [facility] with fever, cough, poor appetite and congestion.  The patient stated has been experiencing a fever, cough and congestion with poor appetite for the last couple of days.  Patient's wife is at the bedside and stated that the patient is not getting up and ambulating like he normally does because he appears to be so fatigued.  She reports she had to have the patient's son come to the house to assist the patient to the emergency department. Per EMR, patient was positive for Covid 1/14/2024.  However, the patient stated he does not feel the same as he did back when he had COVID. In the emergency department a chest x-ray was obtained that revealed no acute cardiopulmonary findings.  The patient's influenza A and B as well as COVID were all negative.  His urinalysis was also negative for infection.  The patient was given Toradol, Zofran, aspirin and 1 L of normal saline.  Upon further evaluation, the patient was found to have an elevated troponin of 35.  His repeat troponin was 39.  However, the patient had a nonischemic EKG and denies any chest pain at this time.  He was admitted for further evaluation and treatment. The patient denied any other associated symptoms as well as any aggravating and/or alleviating factors. At the time of this assessment, the patient was resting comfortably in bed. He currently denies any chest pain, back pain, abdominal pain, shortness of breath, numbness, tingling, N/V/C/D, fever and/or chills.     Assessment/Plan:      Failure to thrive in adult    Failure

## 2025-02-10 NOTE — ED PROVIDER NOTES
[facility] EMERGENCY DEPARTMENT  Emergency Department Encounter    Patient Name: Zaid Ortega  MRN: [id number]  YOB: 1945  Date of Evaluation: 2/9/2025  Provider: Galina Rasmussen APRN - CNP  Note Started: 12:52 AM EST 2/10/25    CHIEF COMPLAINT  Cough (Pt states that he has been having a cold for a few days and symptoms are not improving)    SHARED SERVICE VISIT   I have seen and evaluated this patient with my supervising physician Robyn Alvarez MD.    HISTORY OF PRESENT ILLNESS  History From: Patient and spouse.    Limitations to history : History of dementia    Zaid Ortega is a 79 y.o. male with history of Alzheimer's who presents to the ED for evaluation of URI symptoms onset 2 days.  Patient states he has been having cough, congestion, rhinorrhea, chills, and generalized fatigue.  Spouse states that she is the patient's primary caregiver and has been difficult taking care of the patient at home as he has not been ambulating like he normally does secondary to generalized fatigue.  Spouse states that she had to have the patient's son come to the house in order to assist the patient to make it here to the hospital.  Patient denies any pain at this time.  Patient denies chest pain, abdominal pain, headache, neck pain, and back pain.  Patient denies shortness of breath, nausea, vomiting, changes in bowels, dysuria, hematuria, and and lightheadedness.  No focal neurological weakness, slurred speech, facial droop.      No other complaints, modifying factors or associated symptoms.     Nursing notes reviewed were all reviewed and agreed with or any disagreements were addressed in the HPI.    PMH:  Past Medical History:   Diagnosis Date    Dementia (HCC)     Hyperlipidemia     Iron (Fe) deficiency anemia     MGUS (monoclonal gammopathy of unknown significance)      Surgical History:  Past Surgical History:   Procedure Laterality Date    BACK SURGERY       Family History:  No family history on

## 2025-02-10 NOTE — PLAN OF CARE
Problem: Safety - Adult  Goal: Free from fall injury  Outcome: Progressing     Problem: Confusion  Goal: Confusion, delirium, dementia, or psychosis is improved or at baseline  Description: INTERVENTIONS:  1. Assess for possible contributors to thought disturbance, including medications, impaired vision or hearing, underlying metabolic abnormalities, dehydration, psychiatric diagnoses, and notify attending LIP  2. [place] high risk fall precautions, as indicated  3. Provide frequent short contacts to provide reality reorientation, refocusing and direction  4. Decrease environmental stimuli, including noise as appropriate  5. Monitor and intervene to maintain adequate nutrition, hydration, elimination, sleep and activity  6. If unable to ensure safety without constant attention obtain sitter and review sitter guidelines with assigned personnel  7. Initiate Psychosocial CNS and Spiritual Care consult, as indicated  Outcome: Progressing

## 2025-02-10 NOTE — ED PROVIDER NOTES
[facility] EMERGENCY DEPARTMENT  EMERGENCY DEPARTMENT ENCOUNTER        Patient Name: Zaid Ortega  MRN: [id number]  Birthdate 1945  Date of evaluation: 2/9/2025  Provider: Robyn Alvarez MD  PCP: Galina Rasmussen APRN - CNP  Note Started: 12:53 AM EST 2/10/25    I independently examined and evaluated Zaid Ortega. I personally saw the patient and performed a substantive portion of the visit including all aspects of the medical decision making.  I made/approved the management plan and take responsibility for the patient management.  I am the primary physician of record.    CHIEF COMPLAINT  Cough       HISTORY OF PRESENT ILLNESS  History from : Patient    Limitations to history : None    In brief, Zaid Ortega is a 79 y.o. male  has a past medical history of Dementia (HCC), Hyperlipidemia, Iron (Fe) deficiency anemia, and MGUS (monoclonal gammopathy of unknown significance)., who presents to the ED complaining of cough.  Patient has had cough and congestion for a few days.  Patient has dementia at baseline, his wife cares for him.  She reports that she is unable to care for him anymore due to his level of disability from fatigue and generalized weakness..       REVIEW OF SYSTEMS  All systems reviewed, pertinent positives per HPI otherwise noted to be negative.    Focused exam revealed   PHYSICAL EXAM  ED Triage Vitals [02/09/25 2040]   BP Systolic BP Percentile Diastolic BP Percentile Temp Temp Source Pulse Respirations SpO2   110/72 -- -- 99.6 °F (37.6 °C) Oral 100 15 96 %      Height Weight         -- --           GENERAL APPEARANCE: Awake and alert. Cooperative. no distress.  HENT: Normocephalic. Atraumatic. Mucous membranes are moist  NECK: Supple.  Full range of motion of the neck without stiffness or pain.  EYES: PERRL. EOM's grossly intact.  HEART/CHEST: RRR. No murmurs.  Chest wall is not tender to palpation.  LUNGS: Respirations unlabored. CTAB. Good air exchange. Speaking comfortably in full

## 2025-02-11 LAB
ANION GAP SERPL CALCULATED.3IONS-SCNC: 12 MMOL/L (ref 3–16)
BUN SERPL-MCNC: 19 MG/DL (ref 7–20)
CALCIUM SERPL-MCNC: 10.2 MG/DL (ref 8.3–10.6)
CHLORIDE SERPL-SCNC: 104 MMOL/L (ref 99–110)
CO2 SERPL-SCNC: 25 MMOL/L (ref 21–32)
CREAT SERPL-MCNC: 0.9 MG/DL (ref 0.8–1.3)
DEPRECATED RDW RBC AUTO: 13.3 % (ref 12.4–15.4)
GFR SERPLBLD CREATININE-BSD FMLA CKD-EPI: 86 ML/MIN/{1.73_M2}
GLUCOSE SERPL-MCNC: 123 MG/DL (ref 70–99)
HCT VFR BLD AUTO: 41.2 % (ref 40.5–52.5)
HGB BLD-MCNC: 13.8 G/DL (ref 13.5–17.5)
MCH RBC QN AUTO: 32.1 PG (ref 26–34)
MCHC RBC AUTO-ENTMCNC: 33.5 G/DL (ref 31–36)
MCV RBC AUTO: 95.9 FL (ref 80–100)
PLATELET # BLD AUTO: 204 K/UL (ref 135–450)
PMV BLD AUTO: 7.1 FL (ref 5–10.5)
POTASSIUM SERPL-SCNC: 4.5 MMOL/L (ref 3.5–5.1)
RBC # BLD AUTO: 4.3 M/UL (ref 4.2–5.9)
SODIUM SERPL-SCNC: 141 MMOL/L (ref 136–145)
WBC # BLD AUTO: 7.1 K/UL (ref 4–11)

## 2025-02-11 PROCEDURE — 6370000000 HC RX 637 (ALT 250 FOR IP): Performed by: INTERNAL MEDICINE

## 2025-02-11 PROCEDURE — 85027 COMPLETE CBC AUTOMATED: CPT

## 2025-02-11 PROCEDURE — 2500000003 HC RX 250 WO HCPCS

## 2025-02-11 PROCEDURE — 6360000002 HC RX W HCPCS

## 2025-02-11 PROCEDURE — 2500000003 HC RX 250 WO HCPCS: Performed by: NURSE PRACTITIONER

## 2025-02-11 PROCEDURE — 36415 COLL VENOUS BLD VENIPUNCTURE: CPT

## 2025-02-11 PROCEDURE — 80048 BASIC METABOLIC PNL TOTAL CA: CPT

## 2025-02-11 PROCEDURE — 6370000000 HC RX 637 (ALT 250 FOR IP)

## 2025-02-11 PROCEDURE — 97530 THERAPEUTIC ACTIVITIES: CPT

## 2025-02-11 PROCEDURE — 97166 OT EVAL MOD COMPLEX 45 MIN: CPT

## 2025-02-11 PROCEDURE — 97162 PT EVAL MOD COMPLEX 30 MIN: CPT

## 2025-02-11 PROCEDURE — 6360000002 HC RX W HCPCS: Performed by: NURSE PRACTITIONER

## 2025-02-11 PROCEDURE — 1200000000 HC SEMI PRIVATE

## 2025-02-11 PROCEDURE — 6370000000 HC RX 637 (ALT 250 FOR IP): Performed by: NURSE PRACTITIONER

## 2025-02-11 RX ORDER — OLANZAPINE 10 MG/2ML
5 INJECTION, POWDER, FOR SOLUTION INTRAMUSCULAR NIGHTLY PRN
Status: COMPLETED | OUTPATIENT
Start: 2025-02-11 | End: 2025-02-11

## 2025-02-11 RX ORDER — WATER 10 ML/10ML
INJECTION INTRAMUSCULAR; INTRAVENOUS; SUBCUTANEOUS
Status: COMPLETED
Start: 2025-02-11 | End: 2025-02-11

## 2025-02-11 RX ORDER — OLANZAPINE 10 MG/2ML
5 INJECTION, POWDER, FOR SOLUTION INTRAMUSCULAR NIGHTLY PRN
Status: DISCONTINUED | OUTPATIENT
Start: 2025-02-11 | End: 2025-02-11

## 2025-02-11 RX ORDER — OLANZAPINE 10 MG/2ML
5 INJECTION, POWDER, FOR SOLUTION INTRAMUSCULAR ONCE
Status: COMPLETED | OUTPATIENT
Start: 2025-02-11 | End: 2025-02-11

## 2025-02-11 RX ORDER — QUETIAPINE FUMARATE 25 MG/1
50 TABLET, FILM COATED ORAL NIGHTLY
Status: DISCONTINUED | OUTPATIENT
Start: 2025-02-11 | End: 2025-02-12

## 2025-02-11 RX ADMIN — OLANZAPINE 5 MG: 10 INJECTION, POWDER, FOR SOLUTION INTRAMUSCULAR at 01:54

## 2025-02-11 RX ADMIN — SODIUM CHLORIDE, PRESERVATIVE FREE 10 ML: 5 INJECTION INTRAVENOUS at 09:38

## 2025-02-11 RX ADMIN — QUETIAPINE FUMARATE 25 MG: 25 TABLET ORAL at 09:37

## 2025-02-11 RX ADMIN — TAMSULOSIN HYDROCHLORIDE 0.4 MG: 0.4 CAPSULE ORAL at 09:37

## 2025-02-11 RX ADMIN — MICONAZOLE NITRATE: 2 POWDER TOPICAL at 20:56

## 2025-02-11 RX ADMIN — ATORVASTATIN CALCIUM 20 MG: 10 TABLET, FILM COATED ORAL at 09:37

## 2025-02-11 RX ADMIN — WATER: 1 INJECTION INTRAMUSCULAR; INTRAVENOUS; SUBCUTANEOUS at 21:49

## 2025-02-11 RX ADMIN — SODIUM CHLORIDE, PRESERVATIVE FREE 10 ML: 5 INJECTION INTRAVENOUS at 20:57

## 2025-02-11 RX ADMIN — QUETIAPINE FUMARATE 25 MG: 25 TABLET ORAL at 01:46

## 2025-02-11 RX ADMIN — Medication 5 MG: at 20:36

## 2025-02-11 RX ADMIN — OLANZAPINE 5 MG: 10 INJECTION, POWDER, FOR SOLUTION INTRAMUSCULAR at 20:39

## 2025-02-11 RX ADMIN — ENOXAPARIN SODIUM 40 MG: 100 INJECTION SUBCUTANEOUS at 09:37

## 2025-02-11 RX ADMIN — SERTRALINE 25 MG: 50 TABLET, FILM COATED ORAL at 18:35

## 2025-02-11 RX ADMIN — QUETIAPINE FUMARATE 25 MG: 25 TABLET ORAL at 18:35

## 2025-02-11 RX ADMIN — QUETIAPINE FUMARATE 50 MG: 25 TABLET ORAL at 20:36

## 2025-02-11 NOTE — PLAN OF CARE
Problem: Safety - Adult  Goal: Free from fall injury  2/10/2025 2142 by Chuck Wilkins RN  Outcome: Progressing  Flowsheets (Taken 2/10/2025 2142)  Free From Fall Injury:   Instruct family/caregiver on patient safety   Based on caregiver fall risk screen, instruct family/caregiver to ask for assistance with transferring infant if caregiver noted to have fall risk factors     Problem: Confusion  Goal: Confusion, delirium, dementia, or psychosis is improved or at baseline  Description: INTERVENTIONS:  1. Assess for possible contributors to thought disturbance, including medications, impaired vision or hearing, underlying metabolic abnormalities, dehydration, psychiatric diagnoses, and notify attending LIP  2. [place] high risk fall precautions, as indicated  3. Provide frequent short contacts to provide reality reorientation, refocusing and direction  4. Decrease environmental stimuli, including noise as appropriate  5. Monitor and intervene to maintain adequate nutrition, hydration, elimination, sleep and activity  6. If unable to ensure safety without constant attention obtain sitter and review sitter guidelines with assigned personnel  7. Initiate Psychosocial CNS and Spiritual Care consult, as indicated  2/10/2025 2142 by Chuck Wilkins RN  Outcome: Progressing  Flowsheets (Taken 2/10/2025 2142)  Effect of thought disturbance (confusion, delirium, dementia, or psychosis) are managed with adequate functional status:   Assess for contributors to thought disturbance, including medications, impaired vision or hearing, underlying metabolic abnormalities, dehydration, psychiatric diagnoses, notify LIP   [place] high risk fall precautions, as indicated   Provide frequent short contacts to provide reality reorientation, refocusing and direction   Decrease environmental stimuli, including noise as appropriate   Monitor and intervene to maintain adequate nutrition, hydration, elimination, sleep and activity   If

## 2025-02-11 NOTE — PLAN OF CARE
Problem: Safety - Adult  Goal: Free from fall injury  2/11/2025 1048 by Sandra Koch RN  Outcome: Progressing     Problem: Confusion  Goal: Confusion, delirium, dementia, or psychosis is improved or at baseline  Description: INTERVENTIONS:  1. Assess for possible contributors to thought disturbance, including medications, impaired vision or hearing, underlying metabolic abnormalities, dehydration, psychiatric diagnoses, and notify attending LIP  2. [place] high risk fall precautions, as indicated  3. Provide frequent short contacts to provide reality reorientation, refocusing and direction  4. Decrease environmental stimuli, including noise as appropriate  5. Monitor and intervene to maintain adequate nutrition, hydration, elimination, sleep and activity  6. If unable to ensure safety without constant attention obtain sitter and review sitter guidelines with assigned personnel  7. Initiate Psychosocial CNS and Spiritual Care consult, as indicated  2/11/2025 1048 by Sandra Koch RN  Outcome: Progressing     Problem: Neurosensory - Adult  Goal: Achieves stable or improved neurological status  2/11/2025 1048 by Sandra Koch RN  Outcome: Progressing     Problem: Neurosensory - Adult  Goal: Absence of seizures  Outcome: Progressing     Problem: Neurosensory - Adult  Goal: Remains free of injury related to seizures activity  Outcome: Progressing     Problem: Neurosensory - Adult  Goal: Achieves maximal functionality and self care  Outcome: Progressing

## 2025-02-12 LAB
ANION GAP SERPL CALCULATED.3IONS-SCNC: 12 MMOL/L (ref 3–16)
BUN SERPL-MCNC: 31 MG/DL (ref 7–20)
CALCIUM SERPL-MCNC: 9.7 MG/DL (ref 8.3–10.6)
CHLORIDE SERPL-SCNC: 102 MMOL/L (ref 99–110)
CO2 SERPL-SCNC: 24 MMOL/L (ref 21–32)
CREAT SERPL-MCNC: 1.1 MG/DL (ref 0.8–1.3)
DEPRECATED RDW RBC AUTO: 13 % (ref 12.4–15.4)
GFR SERPLBLD CREATININE-BSD FMLA CKD-EPI: 67 ML/MIN/{1.73_M2}
GLUCOSE SERPL-MCNC: 128 MG/DL (ref 70–99)
HCT VFR BLD AUTO: 36 % (ref 40.5–52.5)
HGB BLD-MCNC: 12.2 G/DL (ref 13.5–17.5)
MCH RBC QN AUTO: 32.2 PG (ref 26–34)
MCHC RBC AUTO-ENTMCNC: 34 G/DL (ref 31–36)
MCV RBC AUTO: 94.7 FL (ref 80–100)
PLATELET # BLD AUTO: 202 K/UL (ref 135–450)
PMV BLD AUTO: 7.5 FL (ref 5–10.5)
POTASSIUM SERPL-SCNC: 3.6 MMOL/L (ref 3.5–5.1)
RBC # BLD AUTO: 3.8 M/UL (ref 4.2–5.9)
SODIUM SERPL-SCNC: 138 MMOL/L (ref 136–145)
WBC # BLD AUTO: 4.5 K/UL (ref 4–11)

## 2025-02-12 PROCEDURE — 80048 BASIC METABOLIC PNL TOTAL CA: CPT

## 2025-02-12 PROCEDURE — 36415 COLL VENOUS BLD VENIPUNCTURE: CPT

## 2025-02-12 PROCEDURE — 6360000002 HC RX W HCPCS

## 2025-02-12 PROCEDURE — 85027 COMPLETE CBC AUTOMATED: CPT

## 2025-02-12 PROCEDURE — 6360000002 HC RX W HCPCS: Performed by: NURSE PRACTITIONER

## 2025-02-12 PROCEDURE — 6370000000 HC RX 637 (ALT 250 FOR IP): Performed by: NURSE PRACTITIONER

## 2025-02-12 PROCEDURE — 6370000000 HC RX 637 (ALT 250 FOR IP)

## 2025-02-12 PROCEDURE — 1200000000 HC SEMI PRIVATE

## 2025-02-12 PROCEDURE — 2500000003 HC RX 250 WO HCPCS: Performed by: NURSE PRACTITIONER

## 2025-02-12 RX ORDER — OLANZAPINE 10 MG/2ML
5 INJECTION, POWDER, FOR SOLUTION INTRAMUSCULAR NIGHTLY PRN
Status: COMPLETED | OUTPATIENT
Start: 2025-02-12 | End: 2025-02-12

## 2025-02-12 RX ADMIN — QUETIAPINE FUMARATE 150 MG: 100 TABLET ORAL at 20:45

## 2025-02-12 RX ADMIN — OLANZAPINE 5 MG: 10 INJECTION, POWDER, FOR SOLUTION INTRAMUSCULAR at 22:35

## 2025-02-12 RX ADMIN — ACETAMINOPHEN 650 MG: 325 TABLET ORAL at 04:40

## 2025-02-12 RX ADMIN — ENOXAPARIN SODIUM 40 MG: 100 INJECTION SUBCUTANEOUS at 10:36

## 2025-02-12 RX ADMIN — SERTRALINE 25 MG: 50 TABLET, FILM COATED ORAL at 17:20

## 2025-02-12 RX ADMIN — MICONAZOLE NITRATE: 2 POWDER TOPICAL at 20:49

## 2025-02-12 RX ADMIN — TAMSULOSIN HYDROCHLORIDE 0.4 MG: 0.4 CAPSULE ORAL at 10:37

## 2025-02-12 RX ADMIN — ATORVASTATIN CALCIUM 20 MG: 10 TABLET, FILM COATED ORAL at 10:37

## 2025-02-12 RX ADMIN — SODIUM CHLORIDE, PRESERVATIVE FREE 10 ML: 5 INJECTION INTRAVENOUS at 10:44

## 2025-02-12 RX ADMIN — SODIUM CHLORIDE, PRESERVATIVE FREE 10 ML: 5 INJECTION INTRAVENOUS at 20:47

## 2025-02-12 RX ADMIN — MICONAZOLE NITRATE: 2 POWDER TOPICAL at 10:38

## 2025-02-12 RX ADMIN — Medication 5 MG: at 20:46

## 2025-02-12 ASSESSMENT — PAIN SCALES - GENERAL: PAINLEVEL_OUTOF10: 0

## 2025-02-12 NOTE — PLAN OF CARE
Problem: Safety - Adult  Goal: Free from fall injury  Outcome: Progressing  Flowsheets (Taken 2/12/2025 1855)  Free From Fall Injury:   Instruct family/caregiver on patient safety   Based on caregiver fall risk screen, instruct family/caregiver to ask for assistance with transferring infant if caregiver noted to have fall risk factors     Problem: Confusion  Goal: Confusion, delirium, dementia, or psychosis is improved or at baseline  Description: INTERVENTIONS:  1. Assess for possible contributors to thought disturbance, including medications, impaired vision or hearing, underlying metabolic abnormalities, dehydration, psychiatric diagnoses, and notify attending LIP  2. [place] high risk fall precautions, as indicated  3. Provide frequent short contacts to provide reality reorientation, refocusing and direction  4. Decrease environmental stimuli, including noise as appropriate  5. Monitor and intervene to maintain adequate nutrition, hydration, elimination, sleep and activity  6. If unable to ensure safety without constant attention obtain sitter and review sitter guidelines with assigned personnel  7. Initiate Psychosocial CNS and Spiritual Care consult, as indicated  Outcome: Progressing  Flowsheets (Taken 2/12/2025 1855)  Effect of thought disturbance (confusion, delirium, dementia, or psychosis) are managed with adequate functional status:   Assess for contributors to thought disturbance, including medications, impaired vision or hearing, underlying metabolic abnormalities, dehydration, psychiatric diagnoses, notify LIP   Provide frequent short contacts to provide reality reorientation, refocusing and direction   [place] high risk fall precautions, as indicated   Decrease environmental stimuli, including noise as appropriate   If unable to ensure safety without constant attention obtain sitter and review sitter guidelines with assigned personnel   Monitor and intervene to maintain adequate nutrition,

## 2025-02-13 PROBLEM — F03.918 DEMENTIA WITH BEHAVIORAL DISTURBANCE (HCC): Status: ACTIVE | Noted: 2025-02-13

## 2025-02-13 LAB
ANION GAP SERPL CALCULATED.3IONS-SCNC: 11 MMOL/L (ref 3–16)
BUN SERPL-MCNC: 28 MG/DL (ref 7–20)
CALCIUM SERPL-MCNC: 9.6 MG/DL (ref 8.3–10.6)
CHLORIDE SERPL-SCNC: 104 MMOL/L (ref 99–110)
CO2 SERPL-SCNC: 27 MMOL/L (ref 21–32)
CREAT SERPL-MCNC: 1 MG/DL (ref 0.8–1.3)
DEPRECATED RDW RBC AUTO: 13.2 % (ref 12.4–15.4)
GFR SERPLBLD CREATININE-BSD FMLA CKD-EPI: 76 ML/MIN/{1.73_M2}
GLUCOSE SERPL-MCNC: 110 MG/DL (ref 70–99)
HCT VFR BLD AUTO: 36.3 % (ref 40.5–52.5)
HGB BLD-MCNC: 12.5 G/DL (ref 13.5–17.5)
MAGNESIUM SERPL-MCNC: 2.28 MG/DL (ref 1.8–2.4)
MCH RBC QN AUTO: 32.4 PG (ref 26–34)
MCHC RBC AUTO-ENTMCNC: 34.6 G/DL (ref 31–36)
MCV RBC AUTO: 93.6 FL (ref 80–100)
PLATELET # BLD AUTO: 220 K/UL (ref 135–450)
PMV BLD AUTO: 7 FL (ref 5–10.5)
POTASSIUM SERPL-SCNC: 3.5 MMOL/L (ref 3.5–5.1)
RBC # BLD AUTO: 3.87 M/UL (ref 4.2–5.9)
SODIUM SERPL-SCNC: 142 MMOL/L (ref 136–145)
WBC # BLD AUTO: 4 K/UL (ref 4–11)

## 2025-02-13 PROCEDURE — 6370000000 HC RX 637 (ALT 250 FOR IP): Performed by: NURSE PRACTITIONER

## 2025-02-13 PROCEDURE — 80048 BASIC METABOLIC PNL TOTAL CA: CPT

## 2025-02-13 PROCEDURE — 85027 COMPLETE CBC AUTOMATED: CPT

## 2025-02-13 PROCEDURE — 1200000000 HC SEMI PRIVATE

## 2025-02-13 PROCEDURE — 6370000000 HC RX 637 (ALT 250 FOR IP): Performed by: PSYCHIATRY & NEUROLOGY

## 2025-02-13 PROCEDURE — 2500000003 HC RX 250 WO HCPCS: Performed by: NURSE PRACTITIONER

## 2025-02-13 PROCEDURE — 90792 PSYCH DIAG EVAL W/MED SRVCS: CPT | Performed by: PSYCHIATRY & NEUROLOGY

## 2025-02-13 PROCEDURE — 6370000000 HC RX 637 (ALT 250 FOR IP)

## 2025-02-13 PROCEDURE — 36415 COLL VENOUS BLD VENIPUNCTURE: CPT

## 2025-02-13 PROCEDURE — 6360000002 HC RX W HCPCS: Performed by: NURSE PRACTITIONER

## 2025-02-13 PROCEDURE — 83735 ASSAY OF MAGNESIUM: CPT

## 2025-02-13 PROCEDURE — 2500000003 HC RX 250 WO HCPCS

## 2025-02-13 RX ORDER — MECOBALAMIN 5000 MCG
10 TABLET,DISINTEGRATING ORAL EVERY EVENING
Status: DISCONTINUED | OUTPATIENT
Start: 2025-02-13 | End: 2025-02-16 | Stop reason: HOSPADM

## 2025-02-13 RX ORDER — RISPERIDONE 1 MG/1
1 TABLET ORAL 2 TIMES DAILY
Status: DISCONTINUED | OUTPATIENT
Start: 2025-02-13 | End: 2025-02-14

## 2025-02-13 RX ORDER — OLANZAPINE 10 MG/2ML
2.5 INJECTION, POWDER, FOR SOLUTION INTRAMUSCULAR 3 TIMES DAILY PRN
Status: DISCONTINUED | OUTPATIENT
Start: 2025-02-13 | End: 2025-02-16 | Stop reason: HOSPADM

## 2025-02-13 RX ORDER — OLANZAPINE 5 MG/1
2.5 TABLET ORAL 3 TIMES DAILY PRN
Status: DISCONTINUED | OUTPATIENT
Start: 2025-02-13 | End: 2025-02-16 | Stop reason: HOSPADM

## 2025-02-13 RX ORDER — MIRTAZAPINE 15 MG/1
15 TABLET, FILM COATED ORAL NIGHTLY
Status: DISCONTINUED | OUTPATIENT
Start: 2025-02-13 | End: 2025-02-16 | Stop reason: HOSPADM

## 2025-02-13 RX ADMIN — SODIUM CHLORIDE, PRESERVATIVE FREE 10 ML: 5 INJECTION INTRAVENOUS at 16:45

## 2025-02-13 RX ADMIN — TAMSULOSIN HYDROCHLORIDE 0.4 MG: 0.4 CAPSULE ORAL at 13:05

## 2025-02-13 RX ADMIN — ENOXAPARIN SODIUM 40 MG: 100 INJECTION SUBCUTANEOUS at 13:06

## 2025-02-13 RX ADMIN — MIRTAZAPINE 15 MG: 15 TABLET, FILM COATED ORAL at 19:27

## 2025-02-13 RX ADMIN — MICONAZOLE NITRATE: 2 POWDER TOPICAL at 13:06

## 2025-02-13 RX ADMIN — Medication 10 MG: at 18:44

## 2025-02-13 RX ADMIN — ATORVASTATIN CALCIUM 20 MG: 10 TABLET, FILM COATED ORAL at 13:06

## 2025-02-13 RX ADMIN — RISPERIDONE 1 MG: 1 TABLET, FILM COATED ORAL at 19:27

## 2025-02-13 RX ADMIN — MICONAZOLE NITRATE: 2 POWDER TOPICAL at 20:42

## 2025-02-13 ASSESSMENT — PAIN SCALES - GENERAL: PAINLEVEL_OUTOF10: 0

## 2025-02-13 NOTE — PLAN OF CARE
Problem: Safety - Adult  Goal: Free from fall injury  2/12/2025 2253 by Cammy Dubon, RN  Outcome: Progressing  2/12/2025 1855 by Juan Capellan, RN  Outcome: Progressing  Flowsheets (Taken 2/12/2025 1855)  Free From Fall Injury:   Instruct family/caregiver on patient safety   Based on caregiver fall risk screen, instruct family/caregiver to ask for assistance with transferring infant if caregiver noted to have fall risk factors     Problem: Skin/Tissue Integrity - Adult  Goal: Skin integrity remains intact  Description: 1.  Monitor for areas of redness and/or skin breakdown  2.  Assess vascular access sites hourly  3.  Every 4-6 hours minimum:  Change oxygen saturation probe site  4.  Every 4-6 hours:  If on nasal continuous positive airway pressure, respiratory therapy assess nares and determine need for appliance change or resting period  Outcome: Progressing

## 2025-02-13 NOTE — CONSULTS
This is a STAT consult, told person who was answering phone for Margaux.    Consult Placed    Who:  margaux  Date: 2/13  Time: 0758     Electronically signed by Helga Castillo on 2/13/2025 at 7:58 AM    
capsule by mouth daily 1/22/24   Edgard Daugherty MD   melatonin 5 MG TABS tablet Take 1 tablet by mouth daily 1/22/24 2/21/24  Edgard Daugherty MD   QUEtiapine (SEROQUEL) 25 MG tablet Take 1 tablet by mouth nightly as needed for Agitation 1/22/24 2/21/24  Edgard Daugherty MD   acetaminophen (TYLENOL) 650 MG extended release tablet Take 1 tablet by mouth    Darell Tomlinson MD   atorvastatin (LIPITOR) 20 MG tablet Take 20 mg by mouth daily 7/6/22 4/1/23  Darell Tomlinson MD   Multiple Vitamins-Minerals (CENTRUM SILVER 50+MEN PO) Centrum Silver    ProviderDarell MD       Chemical Dependency History:   None known    Family Mental Health Hx:    None known    Social Hx:   Per chart:  Pt grew up in [place], [place] and earned an education degree @ [employer]. 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 The couple have been  54 years, and have 2 children .    ROS: no spontaneous complaints     PE:    /80   Pulse 65   Temp 97.5 °F (36.4 °C) (Oral)   Resp 18   Ht 1.753 m (5' 9\")   Wt 81.6 kg (180 lb)   SpO2 93%   BMI 26.58 kg/m²       Motor / Gait: did not assess    Mental Status Examination:    Appearance: in bed. Mildly tremulous.  Behavior/Attitude toward examiner:  pleasant  Speech: mild dysarthria   Mood:  \"ok\"  Affect:  Mood congruent   Thought processes:  confabulating  Thought Content: no SI, no HI, no delusions voiced, no obsessions,  Perceptions: + RTIS  Attention: impaired  Abstraction: impaired  Cognition: impaired  Insight: impaired  Judgment: impaired     LAB: Reviewed all labs obtained during admission to date.      Formulation:  domiciled, , and retired 78yo with dementia whose wife and son brought him in reporting fever, cough, congestion, poor appetite, and reduced

## 2025-02-14 LAB
ANION GAP SERPL CALCULATED.3IONS-SCNC: 10 MMOL/L (ref 3–16)
BUN SERPL-MCNC: 28 MG/DL (ref 7–20)
CALCIUM SERPL-MCNC: 9.5 MG/DL (ref 8.3–10.6)
CHLORIDE SERPL-SCNC: 105 MMOL/L (ref 99–110)
CO2 SERPL-SCNC: 26 MMOL/L (ref 21–32)
CREAT SERPL-MCNC: 1 MG/DL (ref 0.8–1.3)
DEPRECATED RDW RBC AUTO: 13.3 % (ref 12.4–15.4)
GFR SERPLBLD CREATININE-BSD FMLA CKD-EPI: 76 ML/MIN/{1.73_M2}
GLUCOSE SERPL-MCNC: 99 MG/DL (ref 70–99)
HCT VFR BLD AUTO: 36.6 % (ref 40.5–52.5)
HGB BLD-MCNC: 12.3 G/DL (ref 13.5–17.5)
MCH RBC QN AUTO: 32.3 PG (ref 26–34)
MCHC RBC AUTO-ENTMCNC: 33.7 G/DL (ref 31–36)
MCV RBC AUTO: 96 FL (ref 80–100)
PLATELET # BLD AUTO: 240 K/UL (ref 135–450)
PMV BLD AUTO: 7.2 FL (ref 5–10.5)
POTASSIUM SERPL-SCNC: 3.8 MMOL/L (ref 3.5–5.1)
RBC # BLD AUTO: 3.81 M/UL (ref 4.2–5.9)
SODIUM SERPL-SCNC: 141 MMOL/L (ref 136–145)
WBC # BLD AUTO: 5.8 K/UL (ref 4–11)

## 2025-02-14 PROCEDURE — 36415 COLL VENOUS BLD VENIPUNCTURE: CPT

## 2025-02-14 PROCEDURE — 99233 SBSQ HOSP IP/OBS HIGH 50: CPT | Performed by: PSYCHIATRY & NEUROLOGY

## 2025-02-14 PROCEDURE — 1200000000 HC SEMI PRIVATE

## 2025-02-14 PROCEDURE — 2500000003 HC RX 250 WO HCPCS

## 2025-02-14 PROCEDURE — 85027 COMPLETE CBC AUTOMATED: CPT

## 2025-02-14 PROCEDURE — 6370000000 HC RX 637 (ALT 250 FOR IP): Performed by: PSYCHIATRY & NEUROLOGY

## 2025-02-14 PROCEDURE — 6370000000 HC RX 637 (ALT 250 FOR IP)

## 2025-02-14 PROCEDURE — 97116 GAIT TRAINING THERAPY: CPT

## 2025-02-14 PROCEDURE — 97530 THERAPEUTIC ACTIVITIES: CPT

## 2025-02-14 PROCEDURE — 6370000000 HC RX 637 (ALT 250 FOR IP): Performed by: NURSE PRACTITIONER

## 2025-02-14 PROCEDURE — 6360000002 HC RX W HCPCS: Performed by: NURSE PRACTITIONER

## 2025-02-14 PROCEDURE — 97535 SELF CARE MNGMENT TRAINING: CPT

## 2025-02-14 PROCEDURE — 80048 BASIC METABOLIC PNL TOTAL CA: CPT

## 2025-02-14 PROCEDURE — 2500000003 HC RX 250 WO HCPCS: Performed by: NURSE PRACTITIONER

## 2025-02-14 RX ORDER — RISPERIDONE 0.25 MG/1
0.5 TABLET ORAL 2 TIMES DAILY
Status: DISCONTINUED | OUTPATIENT
Start: 2025-02-14 | End: 2025-02-16 | Stop reason: HOSPADM

## 2025-02-14 RX ADMIN — ATORVASTATIN CALCIUM 20 MG: 10 TABLET, FILM COATED ORAL at 09:22

## 2025-02-14 RX ADMIN — MIRTAZAPINE 15 MG: 15 TABLET, FILM COATED ORAL at 21:14

## 2025-02-14 RX ADMIN — TAMSULOSIN HYDROCHLORIDE 0.4 MG: 0.4 CAPSULE ORAL at 09:22

## 2025-02-14 RX ADMIN — SODIUM CHLORIDE, PRESERVATIVE FREE 10 ML: 5 INJECTION INTRAVENOUS at 21:15

## 2025-02-14 RX ADMIN — Medication 10 MG: at 18:07

## 2025-02-14 RX ADMIN — RISPERIDONE 1 MG: 1 TABLET, FILM COATED ORAL at 09:22

## 2025-02-14 RX ADMIN — MICONAZOLE NITRATE: 2 POWDER TOPICAL at 21:14

## 2025-02-14 RX ADMIN — MICONAZOLE NITRATE: 2 POWDER TOPICAL at 09:22

## 2025-02-14 RX ADMIN — RISPERIDONE 0.5 MG: 0.25 TABLET, FILM COATED ORAL at 21:13

## 2025-02-14 RX ADMIN — SODIUM CHLORIDE, PRESERVATIVE FREE 10 ML: 5 INJECTION INTRAVENOUS at 09:23

## 2025-02-14 RX ADMIN — ENOXAPARIN SODIUM 40 MG: 100 INJECTION SUBCUTANEOUS at 09:22

## 2025-02-14 NOTE — PLAN OF CARE
Problem: Safety - Adult  Goal: Free from fall injury  Outcome: Not Progressing     Problem: Safety - Adult  Goal: Free from fall injury  Outcome: Not Progressing     Problem: Confusion  Goal: Confusion, delirium, dementia, or psychosis is improved or at baseline  Description: INTERVENTIONS:  1. Assess for possible contributors to thought disturbance, including medications, impaired vision or hearing, underlying metabolic abnormalities, dehydration, psychiatric diagnoses, and notify attending LIP  2. [place] high risk fall precautions, as indicated  3. Provide frequent short contacts to provide reality reorientation, refocusing and direction  4. Decrease environmental stimuli, including noise as appropriate  5. Monitor and intervene to maintain adequate nutrition, hydration, elimination, sleep and activity  6. If unable to ensure safety without constant attention obtain sitter and review sitter guidelines with assigned personnel  7. Initiate Psychosocial CNS and Spiritual Care consult, as indicated  Outcome: Not Progressing

## 2025-02-14 NOTE — PLAN OF CARE
Problem: Safety - Adult  Goal: Free from fall injury  2/14/2025 1055 by Tracee Suárez RN  Outcome: Progressing  2/14/2025 0147 by Haley Roman RN  Outcome: Not Progressing     Problem: Confusion  Goal: Confusion, delirium, dementia, or psychosis is improved or at baseline  Description: INTERVENTIONS:  1. Assess for possible contributors to thought disturbance, including medications, impaired vision or hearing, underlying metabolic abnormalities, dehydration, psychiatric diagnoses, and notify attending LIP  2. [place] high risk fall precautions, as indicated  3. Provide frequent short contacts to provide reality reorientation, refocusing and direction  4. Decrease environmental stimuli, including noise as appropriate  5. Monitor and intervene to maintain adequate nutrition, hydration, elimination, sleep and activity  6. If unable to ensure safety without constant attention obtain sitter and review sitter guidelines with assigned personnel  7. Initiate Psychosocial CNS and Spiritual Care consult, as indicated  2/14/2025 1055 by Tracee Suárez RN  Outcome: Progressing  2/14/2025 0147 by Haley Roman RN  Outcome: Not Progressing     Problem: Neurosensory - Adult  Goal: Achieves stable or improved neurological status  Outcome: Progressing  Goal: Absence of seizures  2/14/2025 0147 by Haley Roman RN  Outcome: Progressing  Flowsheets (Taken 2/13/2025 2000)  Absence of seizures: Monitor for seizure activity.  If seizure occurs, document type and location of movements and any associated apnea  Goal: Remains free of injury related to seizures activity  Outcome: Progressing     Problem: Respiratory - Adult  Goal: Achieves optimal ventilation and oxygenation  Outcome: Progressing     Problem: Musculoskeletal - Adult  Goal: Return mobility to safest level of function  Outcome: Progressing  Goal: Return ADL status to a safe level of function  Outcome: Progressing     Problem: Genitourinary - Adult  Goal:

## 2025-02-15 LAB
ANION GAP SERPL CALCULATED.3IONS-SCNC: 12 MMOL/L (ref 3–16)
BUN SERPL-MCNC: 26 MG/DL (ref 7–20)
CALCIUM SERPL-MCNC: 9.3 MG/DL (ref 8.3–10.6)
CHLORIDE SERPL-SCNC: 107 MMOL/L (ref 99–110)
CO2 SERPL-SCNC: 23 MMOL/L (ref 21–32)
CREAT SERPL-MCNC: 0.9 MG/DL (ref 0.8–1.3)
DEPRECATED RDW RBC AUTO: 13.3 % (ref 12.4–15.4)
GFR SERPLBLD CREATININE-BSD FMLA CKD-EPI: 86 ML/MIN/{1.73_M2}
GLUCOSE SERPL-MCNC: 125 MG/DL (ref 70–99)
HCT VFR BLD AUTO: 35.1 % (ref 40.5–52.5)
HGB BLD-MCNC: 11.9 G/DL (ref 13.5–17.5)
MCH RBC QN AUTO: 31.9 PG (ref 26–34)
MCHC RBC AUTO-ENTMCNC: 33.9 G/DL (ref 31–36)
MCV RBC AUTO: 94 FL (ref 80–100)
PLATELET # BLD AUTO: 276 K/UL (ref 135–450)
PMV BLD AUTO: 6.9 FL (ref 5–10.5)
POTASSIUM SERPL-SCNC: 3.6 MMOL/L (ref 3.5–5.1)
RBC # BLD AUTO: 3.74 M/UL (ref 4.2–5.9)
SODIUM SERPL-SCNC: 142 MMOL/L (ref 136–145)
WBC # BLD AUTO: 6.5 K/UL (ref 4–11)

## 2025-02-15 PROCEDURE — 1200000000 HC SEMI PRIVATE

## 2025-02-15 PROCEDURE — 6370000000 HC RX 637 (ALT 250 FOR IP): Performed by: PSYCHIATRY & NEUROLOGY

## 2025-02-15 PROCEDURE — 6370000000 HC RX 637 (ALT 250 FOR IP): Performed by: NURSE PRACTITIONER

## 2025-02-15 PROCEDURE — 6370000000 HC RX 637 (ALT 250 FOR IP)

## 2025-02-15 PROCEDURE — 36415 COLL VENOUS BLD VENIPUNCTURE: CPT

## 2025-02-15 PROCEDURE — 80048 BASIC METABOLIC PNL TOTAL CA: CPT

## 2025-02-15 PROCEDURE — 6360000002 HC RX W HCPCS: Performed by: NURSE PRACTITIONER

## 2025-02-15 PROCEDURE — 85027 COMPLETE CBC AUTOMATED: CPT

## 2025-02-15 RX ADMIN — RISPERIDONE 0.5 MG: 0.25 TABLET, FILM COATED ORAL at 10:27

## 2025-02-15 RX ADMIN — MICONAZOLE NITRATE: 2 POWDER TOPICAL at 22:30

## 2025-02-15 RX ADMIN — ATORVASTATIN CALCIUM 20 MG: 10 TABLET, FILM COATED ORAL at 10:27

## 2025-02-15 RX ADMIN — MICONAZOLE NITRATE: 2 POWDER TOPICAL at 10:28

## 2025-02-15 RX ADMIN — TAMSULOSIN HYDROCHLORIDE 0.4 MG: 0.4 CAPSULE ORAL at 10:27

## 2025-02-15 RX ADMIN — Medication 10 MG: at 19:12

## 2025-02-15 RX ADMIN — ENOXAPARIN SODIUM 40 MG: 100 INJECTION SUBCUTANEOUS at 10:27

## 2025-02-15 RX ADMIN — MIRTAZAPINE 15 MG: 15 TABLET, FILM COATED ORAL at 22:20

## 2025-02-15 RX ADMIN — RISPERIDONE 0.5 MG: 0.25 TABLET, FILM COATED ORAL at 22:20

## 2025-02-15 NOTE — PLAN OF CARE
Problem: Safety - Adult  Goal: Free from fall injury  2/14/2025 2342 by Chuck Wilkins RN  Outcome: Progressing  Flowsheets (Taken 2/14/2025 2342)  Free From Fall Injury:   Instruct family/caregiver on patient safety   Based on caregiver fall risk screen, instruct family/caregiver to ask for assistance with transferring infant if caregiver noted to have fall risk factors     Problem: Confusion  Goal: Confusion, delirium, dementia, or psychosis is improved or at baseline  Description: INTERVENTIONS:  1. Assess for possible contributors to thought disturbance, including medications, impaired vision or hearing, underlying metabolic abnormalities, dehydration, psychiatric diagnoses, and notify attending LIP  2. [place] high risk fall precautions, as indicated  3. Provide frequent short contacts to provide reality reorientation, refocusing and direction  4. Decrease environmental stimuli, including noise as appropriate  5. Monitor and intervene to maintain adequate nutrition, hydration, elimination, sleep and activity  6. If unable to ensure safety without constant attention obtain sitter and review sitter guidelines with assigned personnel  7. Initiate Psychosocial CNS and Spiritual Care consult, as indicated  2/14/2025 2342 by Chuck Wilkins RN  Outcome: Progressing  Flowsheets (Taken 2/14/2025 2342)  Effect of thought disturbance (confusion, delirium, dementia, or psychosis) are managed with adequate functional status:   Assess for contributors to thought disturbance, including medications, impaired vision or hearing, underlying metabolic abnormalities, dehydration, psychiatric diagnoses, notify LIP   Provide frequent short contacts to provide reality reorientation, refocusing and direction   Decrease environmental stimuli, including noise as appropriate   [place] high risk fall precautions, as indicated   Monitor and intervene to maintain adequate nutrition, hydration, elimination, sleep and activity   If

## 2025-02-16 VITALS
DIASTOLIC BLOOD PRESSURE: 73 MMHG | BODY MASS INDEX: 26.66 KG/M2 | HEART RATE: 70 BPM | HEIGHT: 69 IN | OXYGEN SATURATION: 92 % | RESPIRATION RATE: 16 BRPM | WEIGHT: 180 LBS | TEMPERATURE: 98.6 F | SYSTOLIC BLOOD PRESSURE: 131 MMHG

## 2025-02-16 PROCEDURE — 6360000002 HC RX W HCPCS: Performed by: NURSE PRACTITIONER

## 2025-02-16 PROCEDURE — 6370000000 HC RX 637 (ALT 250 FOR IP): Performed by: NURSE PRACTITIONER

## 2025-02-16 PROCEDURE — 6370000000 HC RX 637 (ALT 250 FOR IP)

## 2025-02-16 PROCEDURE — 6370000000 HC RX 637 (ALT 250 FOR IP): Performed by: PSYCHIATRY & NEUROLOGY

## 2025-02-16 RX ORDER — MECOBALAMIN 5000 MCG
10 TABLET,DISINTEGRATING ORAL EVERY EVENING
Qty: 90 TABLET | Refills: 2 | Status: SHIPPED | OUTPATIENT
Start: 2025-02-16

## 2025-02-16 RX ORDER — OLANZAPINE 2.5 MG/1
2.5 TABLET, FILM COATED ORAL 3 TIMES DAILY PRN
Qty: 30 TABLET | Refills: 3 | Status: SHIPPED | OUTPATIENT
Start: 2025-02-16

## 2025-02-16 RX ORDER — MIRTAZAPINE 15 MG/1
15 TABLET, FILM COATED ORAL NIGHTLY
Qty: 30 TABLET | Refills: 3 | Status: SHIPPED | OUTPATIENT
Start: 2025-02-16

## 2025-02-16 RX ORDER — RISPERIDONE 0.5 MG/1
0.5 TABLET ORAL 2 TIMES DAILY
Qty: 60 TABLET | Refills: 3 | Status: SHIPPED | OUTPATIENT
Start: 2025-02-16

## 2025-02-16 RX ADMIN — TAMSULOSIN HYDROCHLORIDE 0.4 MG: 0.4 CAPSULE ORAL at 09:11

## 2025-02-16 RX ADMIN — ATORVASTATIN CALCIUM 20 MG: 10 TABLET, FILM COATED ORAL at 09:11

## 2025-02-16 RX ADMIN — RISPERIDONE 0.5 MG: 0.25 TABLET, FILM COATED ORAL at 09:11

## 2025-02-16 RX ADMIN — MICONAZOLE NITRATE: 2 POWDER TOPICAL at 09:18

## 2025-02-16 RX ADMIN — ENOXAPARIN SODIUM 40 MG: 100 INJECTION SUBCUTANEOUS at 09:11

## 2025-02-16 NOTE — PROGRESS NOTES
V2.0    [facility] [place]      Name:  Zaid Ortega /Age/Sex: 1945  (79 y.o. male)   MRN & CSN:  [id number] & [id number] Encounter Date/Time: 2025 10:08 AM EST   Location:  0350/0350-01 PCP: Galina Rasmussen APRN - CNP     Attending:Maciel Ku MD       Hospital Day: 4    Assessment and Recommendations   Zaid Ortega is a 79 y.o. male with pmh of dementia, mood disorder, CKD 3A, BPH, hyperlipidemia who presents with Failure to thrive in adult      Plan:     Failure to thrive  Chest x-ray negative,  COVID, flu, UA negative  Pro-Hubert negative  PT OT for placement likely SNF    Dementia stage VI  Oriented to self  At baseline according to spouse  Required olanzapine 5 mg overnight, as well as 3 PRNs of Seroquel  Will increase Seroquel to 150 mg at nighttime  We will keep the olanzapine as needed overnight  please refrain from over sedating patient  Limit disturbances at night, keep awake during daytime  If patient requires additional as needed of olanzapine overnight will consult psych    Mood disorder  Continue sertraline    BPH  Continue Flomax    Hyperlipidemia  Continue atorvastatin    Diet ADULT DIET; Regular   DVT Prophylaxis [x] Lovenox, []  Heparin, [] SCDs, [] Ambulation,  [] Eliquis, [] Xarelto  [] Coumadin   Code Status Limited   Disposition From: Home  Expected Disposition: SNF  Estimated Date of Discharge: 2 to 3 days  Patient requires continued admission due to medical management   Surrogate Decision Maker/ Cari Garcia     Personally reviewed Lab Studies and Imaging       Subjective:     Chief Complaint: Weakness    Zaid Ortega is a 79 y.o. male who presents with fever, cough, poor appetite, and weakness.  Patient has been experiencing cough congestion poor appetite over the past couple days.  According to patient's wife patient is unable to get up and ambulate as normally does.  Patient was previously diagnosed with COVID on 2024 which she presented with similar 
    V2.0    [facility] Progress Note      Name:  Zaid Ortega /Age/Sex: 1945  (79 y.o. male)   MRN & CSN:  [id number] & [id number] Encounter Date/Time: 2/15/2025 10:08 AM EST   Location:  0350/0350-01 PCP: Galina Rasmussen APRN - CNP     Attending:Jovani Almaraz MD       Hospital Day: 7    Assessment and Recommendations   Zaid Ortega is a 79 y.o. male with pmh of dementia, mood disorder, CKD 3A, BPH, hyperlipidemia who presents with Failure to thrive in adult      Plan:     Failure to thrive  Chest x-ray negative,  COVID, flu, UA negative  Pro-Hubert negative  PT OT SNF likely will be discharged on     Dementia stage VI  Oriented to self  At baseline according to spouse  Limit disturbances at night, keep awake during daytime  Psych signed off  Continue risperidone at reduced dose per psychiatry  Continue melatonin  Continue Zyprexa for as needed agitation  Increase melatonin 10 mg at night  No agitations overnight    Mood disorder  Continue Remeron    BPH  Continue Flomax    Hyperlipidemia  Continue atorvastatin    Diet ADULT DIET; Regular   DVT Prophylaxis [x] Lovenox, []  Heparin, [] SCDs, [] Ambulation,  [] Eliquis, [] Xarelto  [] Coumadin   Code Status Limited   Disposition From: Home  Expected Disposition: SNF  Estimated Date of Discharge: 2 to 3 days  Patient requires continued admission due to medical management   Surrogate Decision Maker/ Cari Garcia     Personally reviewed Lab Studies and Imaging       Subjective:     Chief Complaint: Weakness    Zaid Ortega is a 79 y.o. male who presents with fever, cough, poor appetite, and weakness.  Patient has been experiencing cough congestion poor appetite over the past couple days.  According to patient's wife patient is unable to get up and ambulate as normally does.  Patient was previously diagnosed with COVID on 2024 which she presented with similar symptoms.  Patient's wife states that he is normally ambulatory at home and sometimes 
    V2.0    [facility] [place] Progress Note      Name:  Zaid Ortega /Age/Sex: 1945  (79 y.o. male)   MRN & CSN:  [id number] & [id number] Encounter Date/Time: 2025 10:08 AM EST   Location:  0350/0350-01 PCP: Galina Rasmussen APRN - CNP     Attending:Jovani Almaraz MD       Hospital Day: 5    Assessment and Recommendations   Zaid Ortega is a 79 y.o. male with pmh of dementia, mood disorder, CKD 3A, BPH, hyperlipidemia who presents with Failure to thrive in adult      Plan:     Failure to thrive  Chest x-ray negative,  COVID, flu, UA negative  Pro-Hubert negative  PT OT for placement likely SNF    Dementia stage VI  Oriented to self  At baseline according to spouse  Required olanzapine 5 mg overnight, as well as 3 PRNs of Seroquel  Will increase Seroquel to 150 mg at nighttime  We will try Remeron at night  please refrain from over sedating patient  Limit disturbances at night, keep awake during daytime  If patient requires additional as needed of olanzapine overnight will consult psych    Mood disorder  Continue sertraline    BPH  Continue Flomax    Hyperlipidemia  Continue atorvastatin    Diet ADULT DIET; Regular   DVT Prophylaxis [x] Lovenox, []  Heparin, [] SCDs, [] Ambulation,  [] Eliquis, [] Xarelto  [] Coumadin   Code Status Limited   Disposition From: Home  Expected Disposition: SNF  Estimated Date of Discharge: 2 to 3 days  Patient requires continued admission due to medical management   Surrogate Decision Maker/ Cari Garcia     Personally reviewed Lab Studies and Imaging       Subjective:     Chief Complaint: Weakness    Zaid Ortega is a 79 y.o. male who presents with fever, cough, poor appetite, and weakness.  Patient has been experiencing cough congestion poor appetite over the past couple days.  According to patient's wife patient is unable to get up and ambulate as normally does.  Patient was previously diagnosed with COVID on 2024 which she presented with similar symptoms.  
    V2.0    [facility] [place] Progress Note      Name:  Zaid Ortega /Age/Sex: 1945  (79 y.o. male)   MRN & CSN:  [id number] & [id number] Encounter Date/Time: 2/10/2025 10:08 AM EST   Location:   PCP: Galina Rasmussen APRN - CNP     Attending:Maciel Ku MD       Hospital Day: 2    Assessment and Recommendations   Zaid Ortega is a 79 y.o. male with pmh of dementia, mood disorder, CKD 3A, BPH, hyperlipidemia who presents with Failure to thrive in adult      Plan:     Failure to thrive  Chest x-ray negative,  COVID, flu, UA negative  Continue 75 mL normal saline for 10 hour  Pro-Hubert pending  PT OT for placement    Dementia stage VI  Oriented to self  At baseline according to spouse  Seroquel 25 mg at night  As needed Seroquel as needed during the day for agitation    Mood disorder  Continue sertraline    BPH  Continue Flomax    Hyperlipidemia  Continue atorvastatin    Diet ADULT DIET; Regular   DVT Prophylaxis [x] Lovenox, []  Heparin, [] SCDs, [] Ambulation,  [] Eliquis, [] Xarelto  [] Coumadin   Code Status Limited   Disposition From: Home  Expected Disposition: SNF  Estimated Date of Discharge: 2 to 3 days  Patient requires continued admission due to medical management   Surrogate Decision Maker/ Cari Garcia     Personally reviewed Lab Studies and Imaging       Subjective:     Chief Complaint: Weakness    Zaid Ortega is a 79 y.o. male who presents with fever, cough, poor appetite, and weakness.  Patient has been experiencing cough congestion poor appetite over the past couple days.  According to patient's wife patient is unable to get up and ambulate as normally does.  Patient was previously diagnosed with COVID on 2024 which she presented with similar symptoms.  Patient's wife states that he is normally ambulatory at home and sometimes uses a walker.    In the emergency department patient was given 1 L of normal saline.  Troponin was elevated but repeat was flat.  EKG without any significant 
    [place]2.[place]    [facility] [place]      Name:  Zaid Ortega /Age/Sex: 1945  (79 y.o. male)   MRN & CSN:  [id number] & [id number] Encounter Date/Time: 2025 10:08 AM EST   Location:  0350/0350-01 PCP: Galina Rasmussen APRN - CNP     Attending:Jovani Almaraz MD       Hospital Day: 6    Assessment and Recommendations   Zaid Ortega is a 79 y.o. male with pmh of dementia, mood disorder, CKD 3A, BPH, hyperlipidemia who presents with Failure to thrive in adult      Plan:     Failure to thrive  Chest x-ray negative,  COVID, flu, UA negative  Pro-Hubert negative  PT OT SNF likely will be discharged on     Dementia stage VI  Oriented to self  At baseline according to spouse  Limit disturbances at night, keep awake during daytime  Consulted psych  New regimen of risperidone twice daily and Zyprexa as needed for agitation  Increase melatonin 10 mg at night  No agitations overnight    Mood disorder  Discontinue sertraline  Continue Remeron    BPH  Continue Flomax    Hyperlipidemia  Continue atorvastatin    Diet ADULT DIET; Regular   DVT Prophylaxis [x] Lovenox, []  Heparin, [] SCDs, [] Ambulation,  [] Eliquis, [] Xarelto  [] Coumadin   Code Status Limited   Disposition From: Home  Expected Disposition: SNF  Estimated Date of Discharge: 2 to 3 days  Patient requires continued admission due to medical management   Surrogate Decision Maker/ Cari Garcia     Personally reviewed Lab Studies and Imaging       Subjective:     Chief Complaint: Weakness    Zaid Ortega is a 79 y.o. male who presents with fever, cough, poor appetite, and weakness.  Patient has been experiencing cough congestion poor appetite over the past couple days.  According to patient's wife patient is unable to get up and ambulate as normally does.  Patient was previously diagnosed with COVID on 2024 which she presented with similar symptoms.  Patient's wife states that he is normally ambulatory at home and sometimes uses a 
  4 Eyes Skin Assessment and Patient belongings     The patient is being assess for  Low Parrish    I agree that 2 Nurses have performed a thorough Head to Toe Skin Assessment on the patient. ALL assessment sites listed below have been assessed.       Areas assessed by both nurses:   [x]   Head, Face, and Ears   [x]   Shoulders, Back, and Chest  [x]   Arms, Elbows, and Hands   [x]   Coccyx, Sacrum, and IschIum  [x]   Legs, Feet, and Heels  Scattered bruising  Does the Patient have Skin Breakdown?  No         Parrish Prevention initiated:  NA   Wound Care Orders initiated:  NA      [facility] nurse consulted for Pressure Injury (Stage 3,4, Unstageable, DTI, NWPT, and Complex wounds), New and Established Ostomies:  NA      I agree that 2 Nurses have reviewed patient belongings with the patient/family and documented in the flowsheet upon admission or transfer to the unit.     Belongings  Dental Appliances: None  Vision - Corrective Lenses: None  Hearing Aid: None  Clothing: Footwear, Pants, Shirt  Jewelry: Ring (2 rings sent home with wife)  Body Piercings Removed: N/A  Electronic Devices: None  Weapons (Notify Protective Services/Security): None  Other Valuables: Sent home  Home Medications: None  Valuables Given To: Family (Comment)  Provide Name(s) of Who Valuable(s) Were Given To: wife krunal       Nurse 1 eSignature: Electronically signed by JORDYN VILLASENOR RN on 2/16/25 at 4:30 AM EST    **SHARE this note so that the co-signing nurse is able to place an eSignature**    Nurse 2 eSignature: Electronically signed by Ladonna Linn RN on 2/16/25 at 6:58 AM EST  
  4 Eyes Skin Assessment and Patient belongings     The patient is being assess for  Low Parrish    I agree that 2 Nurses have performed a thorough Head to Toe Skin Assessment on the patient. ALL assessment sites listed below have been assessed.       Areas assessed by both nurses: Cammy RN/Agnieszka RN  [x]   Head, Face, and Ears   [x]   Shoulders, Back, and Chest  [x]   Arms, Elbows, and Hands   [x]   Coccyx, Sacrum, and IschIum  [x]   Legs, Feet, and Heels        Does the Patient have Skin Breakdown?  No       Scattered bruising, R inner thigh bruising, blanchable redness to buttocks, slight redness to groin folds    Parrish Prevention initiated:  NA   Wound Care Orders initiated:  NA      [facility] nurse consulted for Pressure Injury (Stage 3,4, Unstageable, DTI, NWPT, and Complex wounds), New and Established Ostomies:  NA        Nurse 1 eSignature: Electronically signed by Cammy Dubon RN on 2/13/25 at 2:39 AM EST    **SHARE this note so that the co-signing nurse is able to place an eSignature**    Nurse 2 eSignature: {Esignature:[id number]}  
4 Eyes Skin Assessment and Patient belongings     The patient is being assess for  Admission    I agree that 2 Nurses have performed a thorough Head to Toe Skin Assessment on the patient. ALL assessment sites listed below have been assessed.       Areas assessed by both nurses:   [x]   Head, Face, and Ears   [x]   Shoulders, Back, and Chest  [x]   Arms, Elbows, and Hands   [x]   Coccyx, Sacrum, and IschIum  [x]   Legs, Feet, and Heels        Does the Patient have Skin Breakdown?  No  Patient does have a birth devon down the inside of his right thigh that is purple/red that extends from groin to knee.         Parrish Prevention initiated:  Yes   Wound Care Orders initiated:  No      [facility] nurse consulted for Pressure Injury (Stage 3,4, Unstageable, DTI, NWPT, and Complex wounds), New and Established Ostomies:  NA      I agree that 2 Nurses have reviewed patient belongings with the patient/family and documented in the flowsheet upon admission or transfer to the unit.             Nurse 1 eSignature: Electronically signed by Esha Collins RN on 2/10/25 at 2:54 PM EST    **SHARE this note so that the co-signing nurse is able to place an eSignature**    Nurse 2 eSignature: {Esignature:[id number]}       Patient Belonging.  Patient had two rings on that wife took home with her and placed in her wallet. His clothes and shoes were placed in closet in a patient belonging bag.   
A&Ox1. Denied chest pain/heaviness, SOB/.   SR x 3.   Bed alarm on. Bed on lowest position.   AVASYS on.   
Department of Psychiatry  Progress Note    Patient's chart was reviewed. Met with patient.     SUBJECTIVE:      Met with patient and daughter.    Better last night and today - less restless and agitated.    He remains disoriented and unable to track conversation.    Slept most of last night and today so far today.    ROS:   Patient has new complaints: no  Sleeping adequately:  Yes   Appetite adequate: Yes    OBJECTIVE:  VITALS:  /86   Pulse 75   Temp 98 °F (36.7 °C) (Oral)   Resp 18   Ht 1.753 m (5' 9\")   Wt 81.6 kg (180 lb)   SpO2 93%   BMI 26.58 kg/m²     Mental Status Examination:    Appearance: in bed. Sleeping  Behavior/Attitude toward examiner:  pleasant  Speech: mild dysarthria   Mood:  \"ok\"  Affect:  Mood congruent   Thought processes:  confabulating  Thought Content: no SI, no HI, no delusions voiced, no obsessions,  Perceptions: not RTIS  Attention: impaired  Abstraction: impaired  Cognition: impaired  Insight: impaired  Judgment: impaired     Medication:  Scheduled:   mirtazapine  15 mg Oral Nightly    melatonin  10 mg Oral QPM    risperiDONE  1 mg Oral BID    miconazole   Topical BID    tamsulosin  0.4 mg Oral Daily    sodium chloride flush  5-40 mL IntraVENous 2 times per day    enoxaparin  40 mg SubCUTAneous Daily    atorvastatin  20 mg Oral Daily    ondansetron  4 mg IntraVENous Once    ketorolac  15 mg IntraVENous Once        PRN:  OLANZapine **OR** OLANZapine, sodium chloride flush, sodium chloride, ondansetron **OR** ondansetron, polyethylene glycol, acetaminophen **OR** acetaminophen     Formulation:  domiciled, , and retired 78yo with dementia whose wife and son brought him in reporting fever, cough, congestion, poor appetite, and reduced ability to ambulate/ADLs. No significant medical findings in the ED but for an elevated troponin.     Dementia with behavioral disturbance.  Delirium - multifactorial including recent URI symptoms, hospitalizations, and unfamiliar 
James called regarding patient pulling out his PIV line.   He is also refusing to be reinserted again. Confused and slightly agitated and wanted to go home.   PS sent to NP Arleen Arreola and agrees not to put back PIV line for now.   
Occupational Therapy  Facility/Department: [facility] C3 TELE/MED SURG/ONC  Occupational Therapy Initial Assessment & Treatment    Name: Zaid Ortega  : 1945  MRN: [id number]  Date of Service: 2025    Discharge Recommendations:  Subacute/Skilled Nursing Facility  OT Equipment Recommendations  Equipment Needed: No  Other: Defer to facilitiy.    Therapy discharge recommendations are subject to collaboration from the patient’s interdisciplinary healthcare team, including MD and case management recommendations.    Barriers to Home Discharge:   [x] Steps to access home entry or bed/bath:   [x] Unable to transfer, ambulate, or propel wheelchair household distances without assist   [x] Limited available assist at home upon discharge    [] Patient or family requests d/c to post-acute facility    [x] Poor cognition/safety awareness for d/c to home    [] Unable to maintain ordered weight bearing status    [x] Patient with salient signs of long-standing immobility   [x] Decreased independence with ADLs   [x] Increased risk for falls   [] Other:    If pt is unable to be seen after this session, please let this note serve as discharge summary.  Please see case management note for discharge disposition.  Thank you.         Patient Diagnosis(es): The primary encounter diagnosis was Elevated troponin. Diagnoses of Acute cough, Fatigue, unspecified type, and Unable to care for self were also pertinent to this visit.  Past Medical History:  has a past medical history of Dementia (HCC), Hyperlipidemia, Iron (Fe) deficiency anemia, and MGUS (monoclonal gammopathy of unknown significance).  Past Surgical History:  has a past surgical history that includes back surgery; Cholecystectomy; and hernia repair.    Treatment Diagnosis: Failure to thrive in adult      Assessment  Performance deficits / Impairments: Decreased functional mobility ;Decreased ADL status;Decreased strength;Decreased safe awareness;Decreased cognition;Decreased 
Occupational Therapy  Facility/Department: [facility] C3 TELE/MED SURG/ONC  Daily Treatment Note  NAME: Zaid Ortega  : 1945  MRN: [id number]    Date of Service: 2025    Discharge Recommendations:  Subacute/Skilled Nursing Facility  OT Equipment Recommendations  Equipment Needed: No  Other: Defer to facilitiy.    Therapy discharge recommendations are subject to collaboration from the patient’s interdisciplinary healthcare team, including MD and case management recommendations.     Barriers to Home Discharge:   [x] Steps to access home entry or bed/bath:   [x] Unable to transfer, ambulate, or propel wheelchair household distances without assist   [] Limited available assist at home upon discharge    [x] Patient or family requests d/c to post-acute facility    [x] Poor cognition/safety awareness for d/c to home    [] Unable to maintain ordered weight bearing status    [x] Patient with salient signs of long-standing immobility   [x] Decreased independence with ADLs   [x] Increased risk for falls   [] Other:     If pt is unable to be seen after this session, please let this note serve as discharge summary.  Please see case management note for discharge disposition.  Thank you.  Patient Diagnosis(es): The primary encounter diagnosis was Elevated troponin. Diagnoses of Acute cough, Fatigue, unspecified type, and Unable to care for self were also pertinent to this visit.     Assessment   Assessment: Pt seen for follow up OT/PT session this date. Co-treatment utilized to maximize safety and utilize the skill of two skilled therapists.  Pt completed bed mobility with Mod A, sit<>stand with Min A x 2 and complete toilet transfer with Mod A x 2. Pt completed toileting and brief change at toilet with Max A but able to void at toilet. Pt returned to bed at EOS for safety and set up with lunch. Pt would continue to benefit from acute OT at this time to improve functional mobility and ADL independence. D/c recs for SNF when 
PS sent to NP Arleen Arreola - \"Hi Arleen. Patient is being so agitated right now. Trying to get out of bed. Can we have something to make him relax or sleep? Thank you.\"    Ordered to give Olanzapine 5mg IM injection. - Given and please see eMAR.  
Patient AO to self only. VSS. Patient able to obey commands. Patient resting peacefully at this time. Call light in reach.   
Patient at the beginning of the shift got restless right after his family left. RN administered remeron and risperidone around 1930. Patient calmed down and has been sleeping since 2030. Patient  only woke up one time after being incontinent and was trying to take his briefs off. Patient was changed and he went back to sleep right away.No agitation noted over night.  
Patient discharged at this time to the [facility] via ambulance. All belongings sent with patient. Report called to Katelyn, at the [facility]. All questions answered. Electronically signed by FARA DESAI RN on 2/16/25 at 1:10 PM EST    
Patient slightly agitated this morning per nightshift. Meds held d/t patient resting.   
Patient wife requested all for 4 side rails up for safety.   
Physical Therapy    Per RN pt was awake and agitated all night and is now deeply asleep and are awaiting a stat psych consult as well. Will hold this morning and attempt at a later time/date as pt is appropriate and schedule permits. Thank you.     Tatianna Lyons PT, DPT     
Physical Therapy  Facility/Department: [facility] C3 TELE/MED SURG/ONC  Daily Treatment Note  NAME: Zaid Ortega  : 1945  MRN: [id number]    Date of Service: 2025    Discharge Recommendations:  Subacute/Skilled Nursing Facility   PT Equipment Recommendations  Equipment Needed: No  Other: Defer    Patient Diagnosis(es): The primary encounter diagnosis was Elevated troponin. Diagnoses of Acute cough, Fatigue, unspecified type, and Unable to care for self were also pertinent to this visit.    Therapy discharge recommendations are subject to collaboration from the patient’s interdisciplinary healthcare team, including MD and case management recommendations.      Barriers to Home Discharge:   [x] Steps to access home entry or bed/bath:   [x] Unable to transfer, ambulate, or propel wheelchair household distances without assist   [] Limited available assist at home upon discharge    [x] Patient or family requests d/c to post-acute facility    [x] Poor cognition/safety awareness for d/c to home    [] Unable to maintain ordered weight bearing status    [x] Patient with salient signs of long-standing immobility   [x] Decreased independence with ADLs   [x] Increased risk for falls   [] Other:    If pt is unable to be seen after this session, please let this note serve as discharge summary.  Please see case management note for discharge disposition.  Thank you.    Assessment  Assessment: Pt progressed well with therapy this date. Pt able to complete bed mobility with mod A, transfers from the bed with min Ax2 and from the toilet with mod Ax2. Pt was able to ambulate to and from the bathroom this date with a SW and mod+min Ax2. Assist of one therapist in the front for walker managment and assist from 2nd therapist in the back at his trunk for balance. Requires constant verbal and tactile cues for re-direction and safety awareness. Pt would continue to benefit from skilled PT to aid in the above deficits. Continue to rec 
Pt assessment completed and charted. VSS. Pt a/o to self. Medications given crushed in applesauce. Pt is complete check and change. Q2T. Pt has been resting peacefully throughout the night. All 4 sides rails up per family request.  Bed in lowest position and wheels locked. Call light within reach. Bedside table within reach. Non-skid socks in place.   
Pt tele order was D/C per Dr. Daugherty. He is confused and will not leave it on.   
Shift assessment completed. Patient is A&O x2. VSS. Denies Pain. IV site patent, flushed, and saline locked. Patient on RA. Patient admits to passing gas. Patient ambulates by with walker, X-1-SBA and gait belt to bathroom. Medication given per MAR.                 Safety Measures in place:   Denies any needs at this time.   Bed/ Chair alarm on for safety.   Bed locked and in lowest position.    Call light within reach.   Gripper socks applied.   Patient in stable condition when RN leaving room.   
Shift assessment completed. Patient is A&O x4. VSS. Denies Pain. IV site patent/ flushed, and saline locked. Patient on RA. Patient's last BM- was yesterday. Patient admits to passing gas. Patient ambulates with walker, X-1-SBA to bathroom. Medication given per MAR. Pt is calm and sleeping at this time.             Safety Measures in place:   Denies any needs at this time.   Video monitoring not in place at this time patient is on wait list.   Bed/ Chair alarm on for safety.   Bed locked and in lowest position.    Call light within reach.   Gripper socks applied.   Patient in stable condition when RN leaving room.   
Wife, Cari, notified patient was discharged and is on his way the [facility]. Electronically signed by FARA DESAI RN on 2/16/25 at 1:16 PM EST    
Zaid Ortega is a 79 y.o. male admitted for  Principal Problem:    Failure to thrive in adult  Resolved Problems:    * No resolved hospital problems. *  .   Patient Home via family with   Chief Complaint   Patient presents with    Cough     Pt states that he has been having a cold for a few days and symptoms are not improving   .  Patient is alert and Disoriented   Patient's baseline mobility: Baseline Mobility: Cane   Code Status: Limited   Cardiac Rhythm: Cardiac Rhythm: Sinus rhythm     Is patient on baseline Oxygen: no how many Liters:   Abnormal Assessment Findings:     Isolation: None      NIH Score:    C-SSRS: Risk of Suicide: No Risk  Bedside swallow:        Active LDA's:   Peripheral IV 02/09/25 Right Antecubital (Active)     Patient admitted with a ackerman: no If the ackerman is chronic was it exchanged:  Reason for ackerman:   Patient admitted with Central Line:  . PICC line placement confirmed: YES OR NO:[id number]}   Reason for Central line:   Was central line Inserted from an outside facility:        Family/Caregiver Present yes Any Concerns: no   Restraints no  Sitter no         Vitals: MEWS Score: 1    Vitals:    02/10/25 0916 02/10/25 0923 02/10/25 0926 02/10/25 1030   BP:  (!) 169/103 (!) 143/83 (!) 139/96   Pulse:    83   Resp:    18   Temp:    99 °F (37.2 °C)   TempSrc:    Oral   SpO2:    100%   Weight: 81.6 kg (180 lb)      Height: 1.753 m (5' 9\")          Last documented pain score (0-10 scale)    Pain medication administered No.    Pertinent or High Risk Medications/Drips: No.    Pending Blood Product Administration: no    Abnormal labs:   Abnormal Labs Reviewed   CBC WITH AUTO DIFFERENTIAL - Abnormal; Notable for the following components:       Result Value    RBC 4.12 (*)     Hemoglobin 13.3 (*)     Hematocrit 39.3 (*)     All other components within normal limits   COMPREHENSIVE METABOLIC PANEL W/ REFLEX TO MG FOR LOW K - Abnormal; Notable for the following components:    Glucose 151 (*)     BUN 29 (*) 
No  Referring Practitioner: Lisette Mayer DO  Referral Date : 02/10/25  Diagnosis: Failure to thirve in adult  Follows Commands: Impaired  General  General Comments: Pt in bed upon arrival, RN cleared to attempt  Subjective  Subjective: pt confused, oriented to self only. Was agreeable and not resistant to getting OOB         Social/Functional History  Social/Functional History  Lives With: Spouse  Type of Home: Condo (3 level)  Additional Comments: \"Patient lives in a tri level condo with spouse. Spouse reports 1 AIRS, can have bed on second level if needed.Normally Patient can complete 2 flight of steps. Patient ambulates with out AD within the home and uses cane for longer distances. Per wife attends an adult day program x1 week for 5 hrs a day thru [employer] [facility] . Wife does the driving and house work. Wife reports over the last week her dtr has been staying with but has become to much re increased physical needs per\" CM note, pt is a poor historian, oriented to self only.    Vision/Hearing   VILLA       Cognition   Orientation  Overall Orientation Status: Impaired  Orientation Level: Oriented to person;Disoriented to time;Disoriented to situation;Disoriented to place    Objective  Temp: 99.1 °F (37.3 °C)  Pulse: 76  Heart Rate Source: Monitor  Respirations: 18  SpO2: 91 %  O2 Device: None (Room air)  BP: 105/67  MAP (Calculated): 80  BP Location: Right upper arm  BP Method: Automatic  Patient Position: Semi fowlers     Observation/Palpation  Posture: Poor  Observation: posterior lean in sitting, significant anterior lean when sitting in the chair  Gross Assessment  AROM: Generally decreased, functional  Strength: Grossly decreased, non-functional       Bed Mobility Training  Bed Mobility Training: Yes  Supine to Sit: Substantial/Maximal assistance (For trunk)  Sit to Supine: Substantial/Maximal assistance (max A fro BLE)  Balance  Sitting: Impaired  Sitting - Static: Fair (occasional)  Sitting - Dynamic: Poor 
at 8:44 AM

## 2025-02-16 NOTE — CARE COORDINATION
Chart reviewed day 4. Care managed by IM. Evaluated by psych today, signed off. Updated therapy input received. Cert initiated and pending for [facility] tomorrow. Will have bed available on Sunday. Ignacio Torres RN    
Chart reviewed. Spoke with Bela at [facility]. Stated will do onsite review of patient. Has meeting to check bed availability this am. Ignacio Torres RN    Spoke with Bela at [facility]. Stated her onsite eval  went well and they can offer a skilled bed however, they do not have any LTC availability. [facility] stated they could accept and [facility] is still reviewing. Spoke with patients wife krunal via phone. Stated will likely go skilled to [facility] and seek a placement LTC at [facility] pending their LTC insurance being in network. Call to Bela at [facility] will not have bed available till Sunday. Patient seen by Dr Damico this afternoon with medication recommendations. Will likely need a couple days to see if makes a difference. Timing could be good for Sunday. Will initiate pre cert for SOC Sunday. Ignacio Torres RN    
Daniel WILKS for any barriers to d/c. Cert is approved for [facility].         CASE MANAGEMENT DISCHARGE SUMMARY      Discharge to: [facility] skilled     Precertification completed: yes  Hospital Exemption Notification (HENS) completed: yes    IMM given: 2/16/25    New Durable Medical Equipment ordered/agency: none    Transportation:    Family/car:   Medical Transport explained to pt/family. Pt/family voice no agency preference.    Agency used:[employer]   time:115   Ambulance form completed: Yes    Confirmed discharge plan with:     Patient: yes     Family:  yes      Facility/Agency, name:  [facility] faxed [phone]   Phone number for report to facility: [phone]     RN, name: Kelly    Note: Discharging nurse to complete MATTIE, reconcile [facility], and place final copy with patient's discharge packet. RN to ensure that written prescriptions for  Level II medications are sent with patient to the facility as per protocol.    Ignacio Torres, RN      
Hospital day 1: Patient on C3 care managed by IM. Patient from home with wife in tri level condo. Wife reports Patient is at baseline able to complete two flights of stairs at home. Call placed this am edu on SNF recs request referral to [facility]. [facility]. [facility] sent awaiting response. Placement will require cert.BLAYNE Lucas      9357: Spoke with [facility] still reviewing referral. Spoke with [facility] unable to accept, could fill out application for possible LTC care placement to [facility].BLAYNE Lucas    
Spoke with Arthur at [facility]. Stated cannot accept patient. No male beds and may need dementia unit. Ignacio Torres RN    Spoke with wife and family. Updated on above. Stated would like referral to [facility] and [facility]. Done, waiting determinations. Plan for skilled to LTC. Ignacio Torres RN    
Writer submitted auth for SNF at [facility] per Avail[facility] website, instant approval received, start of care tomorrow.  СЕРГЕЙ Wilson   
members/significant others, and if so, who? Yes (Cari)  Plans to Return to Present Housing: Unknown at present  Potential Assistance needed at discharge: Skilled Nursing Facility  Patient expects to discharge to: House  Plan for transportation at discharge: Family    Financial    Payor: [employer] MEDICARE / Plan: AET[employer] MEDICARE-ADVANTAGE PPO / Product Type: Medicare /     Does insurance require precert for SNF: Yes    Potential assistance Purchasing Medications: No      [id number] - [place], [place] - [address] - P [phone] - F [phone]  [address] [facility] [facility] [address]  Phone: [phone] Fax: [phone]      Notes:    Factors facilitating achievement of predicted outcomes: Family support, Cooperative, and Pleasant    Barriers to discharge: Confusion, Decreased endurance, Lower extremity weakness, and Medical complications    Additional Case Management Notes: Patient lives in a tri level condo with spouse. Spouse reports 1 ARIS, can have bed on second level if needed.Normally Patient can complete 2 flight of steps. Patient ambulates with out AD within the home and uses cane for longer distances. Per wife attends an adult day program x1 week for 5 hrs a day thru [employer] . Wife does the driving and house work. Wife reports over the last week her dtr has been staying with but has become to much re increased physical needs. Would possible be open to ST rehab placement pending clinical work up PT/OT amrik     The Plan for Transition of Care is related to the following treatment goals of Elevated troponin [R79.89]  Failure to thrive in adult [R62.7]  Fatigue, unspecified type [R53.83]  Unable to care for self [Z78.9]  Acute cough [R05.1]    The Patient and/or Patient Representative Agree with the Discharge Plan? Yes    BLAYNE Lucas  Case Management Department  Ph: [phone] Fax: [phone]

## 2025-02-16 NOTE — DISCHARGE INSTR - COC
Continuity of Care Form    Patient Name: Zaid Bonds   :  1945  MRN:  [id number]    Admit date:  2025  Discharge date:  25    Code Status Order: Limited   Advance Directives:   Advance Care Flowsheet Documentation             Admitting Physician:  Horacio Lanza MD  PCP: Galina Rasmussen APRN - CNP    [facility]/0350-01  Discharging Unit Phone Number: [phone]    Emergency Contact:   Extended Emergency Contact Information  Primary Emergency Contact: Cari Bonds  Home Phone: [phone]  Relation: Spouse  Secondary Emergency Contact: darcie bonds  Home Phone: [phone]  Mobile Phone: [phone]  Relation: Child    Past Surgical History:  Past Surgical History:   Procedure Laterality Date    BACK SURGERY      CHOLECYSTECTOMY      HERNIA REPAIR         Immunization History:   Immunization History   Administered Date(s) Administered    COVID-19, PFIZER PURPLE top, DILUTE for use, (age 12 y+), 30mcg/0.3mL 2021, 2021, 10/22/2021       Active Problems:  Patient Active Problem List   Diagnosis Code    Altered mental state R41.82    Failure to thrive in adult R62.7    Dementia with behavioral disturbance (HCC) F03.918       Isolation/Infection:   Isolation            No Isolation          Patient Infection Status       Infection Onset Added Last Indicated Last Indicated By Review Planned Expiration Resolved Resolved By    None active    Resolved    COVID-19 24 COVID-19 & Influenza Combo   24 Infection                        Nurse Assessment:  Last Vital Signs: /73   Pulse 70   Temp 98.6 °F (37 °C) (Axillary)   Resp 16   Ht 1.753 m (5' 9\")   Wt 81.6 kg (180 lb)   SpO2 92%   BMI 26.58 kg/m²     Last documented pain score (0-10 scale): Pain Level: 0  Last Weight:   Wt Readings from Last 1 Encounters:   02/10/25 81.6 kg (180 lb)     Mental Status:  disoriented and alert (Oriented to

## 2025-02-16 NOTE — DISCHARGE SUMMARY
V2.0  Discharge Summary    Name:  Zaid Ortega /Age/Sex: 1945 (79 y.o. male)   Admit Date: 2025  Discharge Date: 25    MRN & CSN:  [id number] & [id number] Encounter Date and Time 25 10:32 AM EST    Attending:  No att. providers found Discharging Provider: Armando Evans DO       Hospital Course:     Brief HPI:   HPI per admitting provider:  Zaid Ortega is a 79 y.o. male who presents with fever, cough, poor appetite, and weakness.  Patient has been experiencing cough congestion poor appetite over the past couple days.  According to patient's wife patient is unable to get up and ambulate as normally does.  Patient was previously diagnosed with COVID on 2024 which she presented with similar symptoms.  Patient's wife states that he is normally ambulatory at home and sometimes uses a walker.   - In the emergency department patient was given 1 L of normal saline.  Troponin was elevated but repeat was flat.  EKG without any significant ischemic changes and denies any chest pain.  Patient's COVID flu were negative.       Brief Problem Based Course:   Failure to thrive.  Workup was negative for underlying cause, including negative chest x-ray COVID flu [facility].  PT OT recommended SNF, patient was discharged to SNF.  Dementia.  Patient with agitations.  Psych was consulted to help with medical management.  They recommended stop sertraline and Seroquel and start the following regimen: Remeron 15 mg at night, olanzapine 2.5 mg tablet to take 3 times daily as needed for agitation, risperidone 0.5 mg tablet to take twice daily.    Insomnia.  Discharged with instructions to take melatonin 10 mg nightly.  Mood disorder.  Continue Remeron per above  BPH.  Continue home Flomax at discharge  HLD.  Continue home Lipitor 20 mg at discharge.      Consults this admission:  IP CONSULT TO PSYCHIATRY    Discharge Diagnosis:   Failure to thrive in adult  Dementia with agitation  Insomnia  Mood

## 2025-02-16 NOTE — PLAN OF CARE
Problem: Safety - Adult  Goal: Free from fall injury  Outcome: Progressing  Flowsheets (Taken 2/14/2025 2342 by Chuck Wilkins, RN)  Free From Fall Injury:   Instruct family/caregiver on patient safety   Based on caregiver fall risk screen, instruct family/caregiver to ask for assistance with transferring infant if caregiver noted to have fall risk factors     Problem: Confusion  Goal: Confusion, delirium, dementia, or psychosis is improved or at baseline  Description: INTERVENTIONS:  1. Assess for possible contributors to thought disturbance, including medications, impaired vision or hearing, underlying metabolic abnormalities, dehydration, psychiatric diagnoses, and notify attending LIP  2. [place] high risk fall precautions, as indicated  3. Provide frequent short contacts to provide reality reorientation, refocusing and direction  4. Decrease environmental stimuli, including noise as appropriate  5. Monitor and intervene to maintain adequate nutrition, hydration, elimination, sleep and activity  6. If unable to ensure safety without constant attention obtain sitter and review sitter guidelines with assigned personnel  7. Initiate Psychosocial CNS and Spiritual Care consult, as indicated  Outcome: Progressing  Flowsheets (Taken 2/14/2025 2342 by Chuck Wilkins, RN)  Effect of thought disturbance (confusion, delirium, dementia, or psychosis) are managed with adequate functional status:   Assess for contributors to thought disturbance, including medications, impaired vision or hearing, underlying metabolic abnormalities, dehydration, psychiatric diagnoses, notify LIP   Provide frequent short contacts to provide reality reorientation, refocusing and direction   Decrease environmental stimuli, including noise as appropriate   [place] high risk fall precautions, as indicated   Monitor and intervene to maintain adequate nutrition, hydration, elimination, sleep and activity   If unable to ensure safety without

## 2025-04-02 ENCOUNTER — HOSPITAL ENCOUNTER (INPATIENT)
Age: 80
LOS: 3 days | Discharge: NURSING, REHAB OR LONG-TERM CARE | End: 2025-04-05
Attending: STUDENT IN AN ORGANIZED HEALTH CARE EDUCATION/TRAINING PROGRAM | Admitting: HOSPITALIST
Payer: MEDICARE

## 2025-04-02 ENCOUNTER — HOSPITAL ENCOUNTER (EMERGENCY)
Age: 80
Discharge: ANOTHER HOSPITAL | End: 2025-04-02
Attending: EMERGENCY MEDICINE | Admitting: EMERGENCY MEDICINE
Payer: MEDICARE

## 2025-04-02 ENCOUNTER — APPOINTMENT (OUTPATIENT)
Dept: GENERAL RADIOLOGY | Age: 80
End: 2025-04-02
Payer: MEDICARE

## 2025-04-02 VITALS
TEMPERATURE: 100.3 F | SYSTOLIC BLOOD PRESSURE: 161 MMHG | RESPIRATION RATE: 16 BRPM | HEART RATE: 90 BPM | OXYGEN SATURATION: 96 % | DIASTOLIC BLOOD PRESSURE: 77 MMHG

## 2025-04-02 DIAGNOSIS — R79.89 ELEVATED TROPONIN: ICD-10-CM

## 2025-04-02 DIAGNOSIS — I48.91 ATRIAL FIBRILLATION, UNSPECIFIED TYPE (HCC): Primary | ICD-10-CM

## 2025-04-02 DIAGNOSIS — E87.6 HYPOKALEMIA: ICD-10-CM

## 2025-04-02 DIAGNOSIS — R79.89 ELEVATED TROPONIN: Primary | ICD-10-CM

## 2025-04-02 PROBLEM — I49.9 IRREGULAR HEART RHYTHM: Status: ACTIVE | Noted: 2025-04-02

## 2025-04-02 LAB
ALBUMIN SERPL-MCNC: 3.2 G/DL (ref 3.4–5)
ALBUMIN/GLOB SERPL: 1 {RATIO} (ref 1.1–2.2)
ALP SERPL-CCNC: 83 U/L (ref 40–129)
ALT SERPL-CCNC: 14 U/L (ref 10–40)
ANION GAP SERPL CALCULATED.3IONS-SCNC: 12 MMOL/L (ref 3–16)
AST SERPL-CCNC: 23 U/L (ref 15–37)
BACTERIA URNS QL MICRO: ABNORMAL /HPF
BACTERIA URNS QL MICRO: ABNORMAL /HPF
BASOPHILS # BLD: 0.1 K/UL (ref 0–0.2)
BASOPHILS NFR BLD: 0.6 %
BILIRUB SERPL-MCNC: 0.5 MG/DL (ref 0–1)
BILIRUB UR QL STRIP.AUTO: NEGATIVE
BILIRUB UR QL STRIP.AUTO: NEGATIVE
BUN SERPL-MCNC: 22 MG/DL (ref 7–20)
CALCIUM SERPL-MCNC: 9.1 MG/DL (ref 8.3–10.6)
CHLORIDE SERPL-SCNC: 101 MMOL/L (ref 99–110)
CLARITY UR: ABNORMAL
CLARITY UR: CLEAR
CO2 SERPL-SCNC: 28 MMOL/L (ref 21–32)
COLOR UR: YELLOW
COLOR UR: YELLOW
CREAT SERPL-MCNC: 1 MG/DL (ref 0.8–1.3)
DEPRECATED RDW RBC AUTO: 13.6 % (ref 12.4–15.4)
EKG ATRIAL RATE: 81 BPM
EKG DIAGNOSIS: NORMAL
EKG P AXIS: 18 DEGREES
EKG P-R INTERVAL: 122 MS
EKG Q-T INTERVAL: 364 MS
EKG QRS DURATION: 78 MS
EKG QTC CALCULATION (BAZETT): 422 MS
EKG R AXIS: -8 DEGREES
EKG T AXIS: 29 DEGREES
EKG VENTRICULAR RATE: 81 BPM
EOSINOPHIL # BLD: 0.1 K/UL (ref 0–0.6)
EOSINOPHIL NFR BLD: 0.6 %
EPI CELLS #/AREA URNS HPF: ABNORMAL /HPF (ref 0–5)
FLUAV RNA RESP QL NAA+PROBE: NOT DETECTED
FLUBV RNA RESP QL NAA+PROBE: NOT DETECTED
GFR SERPLBLD CREATININE-BSD FMLA CKD-EPI: 76 ML/MIN/{1.73_M2}
GLUCOSE SERPL-MCNC: 114 MG/DL (ref 70–99)
GLUCOSE UR STRIP.AUTO-MCNC: NEGATIVE MG/DL
GLUCOSE UR STRIP.AUTO-MCNC: NEGATIVE MG/DL
HCT VFR BLD AUTO: 34.6 % (ref 40.5–52.5)
HGB BLD-MCNC: 11.4 G/DL (ref 13.5–17.5)
HGB UR QL STRIP.AUTO: ABNORMAL
HGB UR QL STRIP.AUTO: ABNORMAL
HYALINE CASTS #/AREA URNS LPF: ABNORMAL /LPF (ref 0–2)
KETONES UR STRIP.AUTO-MCNC: ABNORMAL MG/DL
KETONES UR STRIP.AUTO-MCNC: ABNORMAL MG/DL
LACTATE BLDV-SCNC: 1 MMOL/L (ref 0.4–2)
LEUKOCYTE ESTERASE UR QL STRIP.AUTO: NEGATIVE
LEUKOCYTE ESTERASE UR QL STRIP.AUTO: NEGATIVE
LYMPHOCYTES # BLD: 0.9 K/UL (ref 1–5.1)
LYMPHOCYTES NFR BLD: 11.2 %
MAGNESIUM SERPL-MCNC: 1.6 MG/DL (ref 1.8–2.4)
MAGNESIUM SERPL-MCNC: 1.74 MG/DL (ref 1.8–2.4)
MCH RBC QN AUTO: 30 PG (ref 26–34)
MCHC RBC AUTO-ENTMCNC: 32.8 G/DL (ref 31–36)
MCV RBC AUTO: 91.5 FL (ref 80–100)
MONOCYTES # BLD: 0.8 K/UL (ref 0–1.3)
MONOCYTES NFR BLD: 8.9 %
NEUTROPHILS # BLD: 6.6 K/UL (ref 1.7–7.7)
NEUTROPHILS NFR BLD: 78.7 %
NITRITE UR QL STRIP.AUTO: NEGATIVE
NITRITE UR QL STRIP.AUTO: POSITIVE
PH UR STRIP.AUTO: 7 [PH] (ref 5–8)
PH UR STRIP.AUTO: 7 [PH] (ref 5–8)
PLATELET # BLD AUTO: 377 K/UL (ref 135–450)
PMV BLD AUTO: 7.5 FL (ref 5–10.5)
POTASSIUM SERPL-SCNC: 3.1 MMOL/L (ref 3.5–5.1)
POTASSIUM SERPL-SCNC: 3.3 MMOL/L (ref 3.5–5.1)
PROT SERPL-MCNC: 6.3 G/DL (ref 6.4–8.2)
PROT UR STRIP.AUTO-MCNC: 30 MG/DL
PROT UR STRIP.AUTO-MCNC: 30 MG/DL
RBC # BLD AUTO: 3.78 M/UL (ref 4.2–5.9)
RBC #/AREA URNS HPF: >100 /HPF (ref 0–4)
RBC #/AREA URNS HPF: ABNORMAL /HPF (ref 0–4)
REASON FOR REJECTION: NORMAL
REJECTED TEST: NORMAL
SARS-COV-2 RNA RESP QL NAA+PROBE: NOT DETECTED
SODIUM SERPL-SCNC: 141 MMOL/L (ref 136–145)
SP GR UR STRIP.AUTO: 1.01 (ref 1–1.03)
SP GR UR STRIP.AUTO: 1.02 (ref 1–1.03)
TROPONIN, HIGH SENSITIVITY: 45 NG/L (ref 0–22)
TROPONIN, HIGH SENSITIVITY: 46 NG/L (ref 0–22)
TROPONIN, HIGH SENSITIVITY: 52 NG/L (ref 0–22)
UA COMPLETE W REFLEX CULTURE PNL UR: ABNORMAL
UA DIPSTICK W REFLEX MICRO PNL UR: YES
UA DIPSTICK W REFLEX MICRO PNL UR: YES
URN SPEC COLLECT METH UR: ABNORMAL
URN SPEC COLLECT METH UR: ABNORMAL
UROBILINOGEN UR STRIP-ACNC: 0.2 E.U./DL
UROBILINOGEN UR STRIP-ACNC: 0.2 E.U./DL
WBC # BLD AUTO: 8.4 K/UL (ref 4–11)
WBC #/AREA URNS HPF: ABNORMAL /HPF (ref 0–5)
WBC #/AREA URNS HPF: ABNORMAL /HPF (ref 0–5)

## 2025-04-02 PROCEDURE — 87086 URINE CULTURE/COLONY COUNT: CPT

## 2025-04-02 PROCEDURE — 83735 ASSAY OF MAGNESIUM: CPT

## 2025-04-02 PROCEDURE — 93005 ELECTROCARDIOGRAM TRACING: CPT

## 2025-04-02 PROCEDURE — 87040 BLOOD CULTURE FOR BACTERIA: CPT

## 2025-04-02 PROCEDURE — 84484 ASSAY OF TROPONIN QUANT: CPT

## 2025-04-02 PROCEDURE — 6370000000 HC RX 637 (ALT 250 FOR IP)

## 2025-04-02 PROCEDURE — 99285 EMERGENCY DEPT VISIT HI MDM: CPT

## 2025-04-02 PROCEDURE — 84100 ASSAY OF PHOSPHORUS: CPT

## 2025-04-02 PROCEDURE — 6370000000 HC RX 637 (ALT 250 FOR IP): Performed by: NURSE PRACTITIONER

## 2025-04-02 PROCEDURE — 6360000002 HC RX W HCPCS

## 2025-04-02 PROCEDURE — 80053 COMPREHEN METABOLIC PANEL: CPT

## 2025-04-02 PROCEDURE — 87636 SARSCOV2 & INF A&B AMP PRB: CPT

## 2025-04-02 PROCEDURE — 96365 THER/PROPH/DIAG IV INF INIT: CPT

## 2025-04-02 PROCEDURE — 93005 ELECTROCARDIOGRAM TRACING: CPT | Performed by: NURSE PRACTITIONER

## 2025-04-02 PROCEDURE — 36415 COLL VENOUS BLD VENIPUNCTURE: CPT

## 2025-04-02 PROCEDURE — 2500000003 HC RX 250 WO HCPCS: Performed by: NURSE PRACTITIONER

## 2025-04-02 PROCEDURE — 84132 ASSAY OF SERUM POTASSIUM: CPT

## 2025-04-02 PROCEDURE — 93010 ELECTROCARDIOGRAM REPORT: CPT | Performed by: INTERNAL MEDICINE

## 2025-04-02 PROCEDURE — 84443 ASSAY THYROID STIM HORMONE: CPT

## 2025-04-02 PROCEDURE — 85025 COMPLETE CBC W/AUTO DIFF WBC: CPT

## 2025-04-02 PROCEDURE — 2580000003 HC RX 258: Performed by: NURSE PRACTITIONER

## 2025-04-02 PROCEDURE — 83605 ASSAY OF LACTIC ACID: CPT

## 2025-04-02 PROCEDURE — 81001 URINALYSIS AUTO W/SCOPE: CPT

## 2025-04-02 PROCEDURE — 96366 THER/PROPH/DIAG IV INF ADDON: CPT

## 2025-04-02 PROCEDURE — 71045 X-RAY EXAM CHEST 1 VIEW: CPT

## 2025-04-02 PROCEDURE — 2060000000 HC ICU INTERMEDIATE R&B

## 2025-04-02 PROCEDURE — 2580000003 HC RX 258

## 2025-04-02 RX ORDER — POTASSIUM CHLORIDE 7.45 MG/ML
10 INJECTION INTRAVENOUS
Status: COMPLETED | OUTPATIENT
Start: 2025-04-02 | End: 2025-04-02

## 2025-04-02 RX ORDER — TAMSULOSIN HYDROCHLORIDE 0.4 MG/1
0.4 CAPSULE ORAL DAILY
Status: DISCONTINUED | OUTPATIENT
Start: 2025-04-03 | End: 2025-04-05 | Stop reason: HOSPADM

## 2025-04-02 RX ORDER — MAGNESIUM SULFATE IN WATER 40 MG/ML
2000 INJECTION, SOLUTION INTRAVENOUS ONCE
Status: COMPLETED | OUTPATIENT
Start: 2025-04-03 | End: 2025-04-03

## 2025-04-02 RX ORDER — MECOBALAMIN 5000 MCG
10 TABLET,DISINTEGRATING ORAL
Status: DISCONTINUED | OUTPATIENT
Start: 2025-04-02 | End: 2025-04-05 | Stop reason: HOSPADM

## 2025-04-02 RX ORDER — MIRTAZAPINE 15 MG/1
15 TABLET, FILM COATED ORAL NIGHTLY
Status: DISCONTINUED | OUTPATIENT
Start: 2025-04-02 | End: 2025-04-05 | Stop reason: HOSPADM

## 2025-04-02 RX ORDER — POTASSIUM CHLORIDE 750 MG/1
10 TABLET, EXTENDED RELEASE ORAL DAILY
Status: DISCONTINUED | OUTPATIENT
Start: 2025-04-03 | End: 2025-04-05 | Stop reason: HOSPADM

## 2025-04-02 RX ORDER — FUROSEMIDE 20 MG/1
20 TABLET ORAL DAILY
COMMUNITY

## 2025-04-02 RX ORDER — PROCHLORPERAZINE EDISYLATE 5 MG/ML
10 INJECTION INTRAMUSCULAR; INTRAVENOUS EVERY 6 HOURS PRN
Status: DISCONTINUED | OUTPATIENT
Start: 2025-04-02 | End: 2025-04-05 | Stop reason: HOSPADM

## 2025-04-02 RX ORDER — POTASSIUM CHLORIDE 7.45 MG/ML
10 INJECTION INTRAVENOUS
Status: COMPLETED | OUTPATIENT
Start: 2025-04-03 | End: 2025-04-03

## 2025-04-02 RX ORDER — POLYETHYLENE GLYCOL 3350 17 G/17G
17 POWDER, FOR SOLUTION ORAL DAILY PRN
Status: DISCONTINUED | OUTPATIENT
Start: 2025-04-02 | End: 2025-04-05 | Stop reason: HOSPADM

## 2025-04-02 RX ORDER — ACETAMINOPHEN 650 MG/1
650 SUPPOSITORY RECTAL EVERY 6 HOURS PRN
Status: DISCONTINUED | OUTPATIENT
Start: 2025-04-02 | End: 2025-04-05 | Stop reason: HOSPADM

## 2025-04-02 RX ORDER — POTASSIUM CHLORIDE 750 MG/1
10 CAPSULE, EXTENDED RELEASE ORAL DAILY
COMMUNITY

## 2025-04-02 RX ORDER — SODIUM CHLORIDE 0.9 % (FLUSH) 0.9 %
5-40 SYRINGE (ML) INJECTION EVERY 12 HOURS SCHEDULED
Status: DISCONTINUED | OUTPATIENT
Start: 2025-04-02 | End: 2025-04-05 | Stop reason: HOSPADM

## 2025-04-02 RX ORDER — FUROSEMIDE 20 MG/1
20 TABLET ORAL DAILY
Status: DISCONTINUED | OUTPATIENT
Start: 2025-04-03 | End: 2025-04-04

## 2025-04-02 RX ORDER — SODIUM CHLORIDE 0.9 % (FLUSH) 0.9 %
5-40 SYRINGE (ML) INJECTION PRN
Status: DISCONTINUED | OUTPATIENT
Start: 2025-04-02 | End: 2025-04-05 | Stop reason: HOSPADM

## 2025-04-02 RX ORDER — ATORVASTATIN CALCIUM 10 MG/1
20 TABLET, FILM COATED ORAL DAILY
Status: DISCONTINUED | OUTPATIENT
Start: 2025-04-03 | End: 2025-04-05 | Stop reason: HOSPADM

## 2025-04-02 RX ORDER — LOPERAMIDE HYDROCHLORIDE 2 MG/1
2 CAPSULE ORAL EVERY 8 HOURS PRN
COMMUNITY

## 2025-04-02 RX ORDER — LOPERAMIDE HYDROCHLORIDE 2 MG/1
2 CAPSULE ORAL EVERY 8 HOURS PRN
Status: DISCONTINUED | OUTPATIENT
Start: 2025-04-02 | End: 2025-04-05 | Stop reason: HOSPADM

## 2025-04-02 RX ORDER — ACETAMINOPHEN 325 MG/1
650 TABLET ORAL EVERY 6 HOURS PRN
Status: DISCONTINUED | OUTPATIENT
Start: 2025-04-02 | End: 2025-04-05 | Stop reason: HOSPADM

## 2025-04-02 RX ORDER — ACETAMINOPHEN 500 MG
500 TABLET ORAL ONCE
Status: COMPLETED | OUTPATIENT
Start: 2025-04-02 | End: 2025-04-02

## 2025-04-02 RX ORDER — ONDANSETRON 4 MG/1
4 TABLET, ORALLY DISINTEGRATING ORAL EVERY 8 HOURS PRN
COMMUNITY

## 2025-04-02 RX ORDER — 0.9 % SODIUM CHLORIDE 0.9 %
1000 INTRAVENOUS SOLUTION INTRAVENOUS ONCE
Status: COMPLETED | OUTPATIENT
Start: 2025-04-02 | End: 2025-04-02

## 2025-04-02 RX ORDER — RISPERIDONE 0.25 MG/1
0.5 TABLET ORAL 2 TIMES DAILY
Status: DISCONTINUED | OUTPATIENT
Start: 2025-04-02 | End: 2025-04-05 | Stop reason: HOSPADM

## 2025-04-02 RX ORDER — SODIUM CHLORIDE 9 MG/ML
INJECTION, SOLUTION INTRAVENOUS CONTINUOUS
Status: ACTIVE | OUTPATIENT
Start: 2025-04-02 | End: 2025-04-03

## 2025-04-02 RX ORDER — SODIUM CHLORIDE 9 MG/ML
INJECTION, SOLUTION INTRAVENOUS PRN
Status: DISCONTINUED | OUTPATIENT
Start: 2025-04-02 | End: 2025-04-05 | Stop reason: HOSPADM

## 2025-04-02 RX ADMIN — SODIUM CHLORIDE 1000 ML: 0.9 INJECTION, SOLUTION INTRAVENOUS at 16:16

## 2025-04-02 RX ADMIN — MIRTAZAPINE 15 MG: 15 TABLET, FILM COATED ORAL at 22:19

## 2025-04-02 RX ADMIN — SODIUM CHLORIDE: 0.9 INJECTION, SOLUTION INTRAVENOUS at 22:18

## 2025-04-02 RX ADMIN — RISPERIDONE 0.5 MG: 0.25 TABLET, FILM COATED ORAL at 22:19

## 2025-04-02 RX ADMIN — POTASSIUM CHLORIDE 10 MEQ: 7.46 INJECTION, SOLUTION INTRAVENOUS at 18:27

## 2025-04-02 RX ADMIN — Medication 10 ML: at 22:23

## 2025-04-02 RX ADMIN — POTASSIUM CHLORIDE 10 MEQ: 7.46 INJECTION, SOLUTION INTRAVENOUS at 16:18

## 2025-04-02 RX ADMIN — ACETAMINOPHEN 500 MG: 500 TABLET ORAL at 19:15

## 2025-04-02 RX ADMIN — Medication 10 MG: at 22:19

## 2025-04-02 ASSESSMENT — PAIN - FUNCTIONAL ASSESSMENT: PAIN_FUNCTIONAL_ASSESSMENT: NONE - DENIES PAIN

## 2025-04-02 NOTE — ED PROVIDER NOTES
The patient was seen by me in conjunction with the advanced practice clinician. I have personally performed and/or participated in the history, exam and medical decision making and agree with all pertinent clinical information. All lab results, EKG tracings, and radiographic studies or interpretations were reviewed by me.    I have participated in the key portions of the examination and treatment plan for the patient. I have reviewed the APC's findings and agree.I have personally performed a face-to-face diagnostic evaluation on the patient.  My findings are as follows:    This patient presented with fever and tachycardia from [facility] care unit, history is not available from this patient as he is chronically unable to converse  They appear non toxic.   Vitals viewed.   Exam shows: Tachycardia, awake and does not appear uncomfortable.  Lungs are clear, heart irregular with atrial fibrillation monitor, controlled rate, apparently no history of A-fib.  I examined his lower extremities and he has no evidence of decubiti or any signs of skin infection  Evaluation: I reviewed the patient's ECG which reveals atrial fibs with a rate of 81, 1 PVC, no ST elevation or depression.  We also compared this to previous ECG from February 9 this year which showed sinus rhythm.    Treatment includes: Patient may require hospitalization due to elevated troponin and new onset A-fib    Additionally he has hypokalemia and will be supplemented  Disposition per Advanced clinician     [facility]     Emergency Department     Faculty Note/ Attestation      3:54 PM EDT  Pt Name: Zaid Ortega                                       MRN: [id number]  Birthdate 1945  Date of evaluation: 4/2/2025  Patients PCP:    Galina Rasmussen, ARGENIS - CNP    Attestation  I performed a history and physical examination of the patient/ or directly observed  and discussed management with the resident. I reviewed the resident’s note and agree

## 2025-04-02 NOTE — ED PROVIDER NOTES
[facility] EMERGENCY DEPARTMENT  EMERGENCY DEPARTMENT ENCOUNTER        Pt Name: Zaid Ortega  MRN: [id number]  Birthdate 1945  Date of evaluation: 4/2/2025  Provider: JAYSON Grayson  PCP: Galina Rasmussen APRN - CNP  Note Started: 2:58 PM EDT 4/2/25       I have seen and evaluated this patient with my supervising physician Ann Fajardo MD.      CHIEF COMPLAINT       Chief Complaint   Patient presents with    Fever     Pt sent for fever.  Not febrile here.  Comes from [facility] memory care unit.  Nurse states the flu is going around the unit.       HISTORY OF PRESENT ILLNESS: 1 or more Elements     History From: EMS and patient    Limitations to history : Dementia    Social Determinants Significantly Affecting Health : None    Chief Complaint: Fever    Zaid Ortega is a 80 y.o. male who presents to the emergency department for a fever.  [facility].  EMS reports that the flu was currently going around their unit.  On arrival patient is afebrile.  He denies being in pain anywhere.  Patient does have a history of dementia and history is limited.  Patient's family reports that the patient is at baseline mentation.    Nursing Notes were all reviewed and agreed with or any disagreements were addressed in the HPI.    REVIEW OF SYSTEMS :      Review of Systems    Positives and Pertinent negatives as per HPI.     SURGICAL HISTORY     Past Surgical History:   Procedure Laterality Date    BACK SURGERY      CHOLECYSTECTOMY      HERNIA REPAIR         CURRENTMEDICATIONS       Previous Medications    ACETAMINOPHEN (TYLENOL) 650 MG EXTENDED RELEASE TABLET    Take 1 tablet by mouth    ATORVASTATIN (LIPITOR) 20 MG TABLET    Take 1 tablet by mouth daily    FUROSEMIDE (LASIX) 20 MG TABLET    Take 1 tablet by mouth daily    LOPERAMIDE (IMODIUM) 2 MG CAPSULE    Take 1 capsule by mouth every 8 hours as needed for Diarrhea    MELATONIN 5 MG TBDP DISINTEGRATING TABLET

## 2025-04-02 NOTE — ED NOTES
2950 - Call from Dr. Weiner; requesting to speak to JAYSON Miles. Case is being discussed at this time.

## 2025-04-02 NOTE — ED NOTES
Pt's wife Cari requests pt be transferred to [facility] for any further care.Katelyn Zamudio RN

## 2025-04-03 ENCOUNTER — APPOINTMENT (OUTPATIENT)
Age: 80
End: 2025-04-03
Attending: INTERNAL MEDICINE
Payer: MEDICARE

## 2025-04-03 LAB
ANION GAP SERPL CALCULATED.3IONS-SCNC: 8 MMOL/L (ref 3–16)
BASOPHILS # BLD: 0 K/UL (ref 0–0.2)
BASOPHILS NFR BLD: 0.5 %
BUN SERPL-MCNC: 18 MG/DL (ref 7–20)
C DIFF TOX A+B STL QL IA: NORMAL
CALCIUM SERPL-MCNC: 9.1 MG/DL (ref 8.3–10.6)
CHLORIDE SERPL-SCNC: 102 MMOL/L (ref 99–110)
CO2 SERPL-SCNC: 29 MMOL/L (ref 21–32)
CREAT SERPL-MCNC: 0.8 MG/DL (ref 0.8–1.3)
DEPRECATED RDW RBC AUTO: 13.8 % (ref 12.4–15.4)
ECHO AO ASC DIAM: 3.3 CM
ECHO AO ASCENDING AORTA INDEX: 1.68 CM/M2
ECHO AO ROOT DIAM: 3.2 CM
ECHO AO ROOT INDEX: 1.62 CM/M2
ECHO AV MEAN GRADIENT: 5 MMHG
ECHO AV MEAN VELOCITY: 1.1 M/S
ECHO AV PEAK GRADIENT: 9 MMHG
ECHO AV PEAK VELOCITY: 1.5 M/S
ECHO AV VTI: 29.4 CM
ECHO BSA: 1.98 M2
ECHO LA AREA 2C: 13 CM2
ECHO LA AREA 4C: 12.8 CM2
ECHO LA DIAMETER INDEX: 1.78 CM/M2
ECHO LA DIAMETER: 3.5 CM
ECHO LA MAJOR AXIS: 4.6 CM
ECHO LA MINOR AXIS: 5.1 CM
ECHO LA TO AORTIC ROOT RATIO: 1.09
ECHO LA VOL BP: 29 ML (ref 18–58)
ECHO LA VOL MOD A2C: 28 ML (ref 18–58)
ECHO LA VOL MOD A4C: 29 ML (ref 18–58)
ECHO LA VOL/BSA BIPLANE: 15 ML/M2 (ref 16–34)
ECHO LA VOLUME INDEX MOD A2C: 14 ML/M2 (ref 16–34)
ECHO LA VOLUME INDEX MOD A4C: 15 ML/M2 (ref 16–34)
ECHO LV EF PHYSICIAN: 55 %
ECHO LV FRACTIONAL SHORTENING: 37 % (ref 28–44)
ECHO LV INTERNAL DIMENSION DIASTOLE INDEX: 2.59 CM/M2
ECHO LV INTERNAL DIMENSION DIASTOLIC: 5.1 CM (ref 4.2–5.9)
ECHO LV INTERNAL DIMENSION SYSTOLIC INDEX: 1.62 CM/M2
ECHO LV INTERNAL DIMENSION SYSTOLIC: 3.2 CM
ECHO LV IVSD: 0.9 CM (ref 0.6–1)
ECHO LV MASS 2D: 163.6 G (ref 88–224)
ECHO LV MASS INDEX 2D: 83 G/M2 (ref 49–115)
ECHO LV POSTERIOR WALL DIASTOLIC: 0.9 CM (ref 0.6–1)
ECHO LV RELATIVE WALL THICKNESS RATIO: 0.35
ECHO LVOT AREA: 3.1 CM2
ECHO LVOT DIAM: 2 CM
ECHO RA AREA 4C: 11.3 CM2
ECHO RA END SYSTOLIC VOLUME APICAL 4 CHAMBER INDEX BSA: 11 ML/M2
ECHO RA VOLUME: 22 ML
ECHO RV TAPSE: 1.7 CM (ref 1.7–?)
EOSINOPHIL # BLD: 0.2 K/UL (ref 0–0.6)
EOSINOPHIL NFR BLD: 3 %
GFR SERPLBLD CREATININE-BSD FMLA CKD-EPI: 89 ML/MIN/{1.73_M2}
GLUCOSE SERPL-MCNC: 103 MG/DL (ref 70–99)
HCT VFR BLD AUTO: 29.8 % (ref 40.5–52.5)
HGB BLD-MCNC: 9.8 G/DL (ref 13.5–17.5)
LYMPHOCYTES # BLD: 1.1 K/UL (ref 1–5.1)
LYMPHOCYTES NFR BLD: 16.2 %
MAGNESIUM SERPL-MCNC: 2.23 MG/DL (ref 1.8–2.4)
MCH RBC QN AUTO: 30.4 PG (ref 26–34)
MCHC RBC AUTO-ENTMCNC: 33 G/DL (ref 31–36)
MCV RBC AUTO: 92.1 FL (ref 80–100)
MONOCYTES # BLD: 0.7 K/UL (ref 0–1.3)
MONOCYTES NFR BLD: 10.1 %
NEUTROPHILS # BLD: 5 K/UL (ref 1.7–7.7)
NEUTROPHILS NFR BLD: 70.2 %
PHOSPHATE SERPL-MCNC: 3 MG/DL (ref 2.5–4.9)
PLATELET # BLD AUTO: 328 K/UL (ref 135–450)
PMV BLD AUTO: 7.2 FL (ref 5–10.5)
POTASSIUM SERPL-SCNC: 3.4 MMOL/L (ref 3.5–5.1)
RBC # BLD AUTO: 3.23 M/UL (ref 4.2–5.9)
SODIUM SERPL-SCNC: 139 MMOL/L (ref 136–145)
TSH SERPL DL<=0.005 MIU/L-ACNC: 3.35 UIU/ML (ref 0.27–4.2)
WBC # BLD AUTO: 7 K/UL (ref 4–11)

## 2025-04-03 PROCEDURE — 83735 ASSAY OF MAGNESIUM: CPT

## 2025-04-03 PROCEDURE — 2060000000 HC ICU INTERMEDIATE R&B

## 2025-04-03 PROCEDURE — 85025 COMPLETE CBC W/AUTO DIFF WBC: CPT

## 2025-04-03 PROCEDURE — 6370000000 HC RX 637 (ALT 250 FOR IP): Performed by: INTERNAL MEDICINE

## 2025-04-03 PROCEDURE — 6370000000 HC RX 637 (ALT 250 FOR IP): Performed by: NURSE PRACTITIONER

## 2025-04-03 PROCEDURE — 87449 NOS EACH ORGANISM AG IA: CPT

## 2025-04-03 PROCEDURE — 36415 COLL VENOUS BLD VENIPUNCTURE: CPT

## 2025-04-03 PROCEDURE — 6360000002 HC RX W HCPCS: Performed by: NURSE PRACTITIONER

## 2025-04-03 PROCEDURE — 80048 BASIC METABOLIC PNL TOTAL CA: CPT

## 2025-04-03 PROCEDURE — 93306 TTE W/DOPPLER COMPLETE: CPT

## 2025-04-03 PROCEDURE — 99223 1ST HOSP IP/OBS HIGH 75: CPT | Performed by: INTERNAL MEDICINE

## 2025-04-03 PROCEDURE — 93306 TTE W/DOPPLER COMPLETE: CPT | Performed by: INTERNAL MEDICINE

## 2025-04-03 PROCEDURE — 2500000003 HC RX 250 WO HCPCS: Performed by: NURSE PRACTITIONER

## 2025-04-03 PROCEDURE — 87324 CLOSTRIDIUM AG IA: CPT

## 2025-04-03 RX ORDER — AMLODIPINE BESYLATE 5 MG/1
5 TABLET ORAL DAILY
Status: DISCONTINUED | OUTPATIENT
Start: 2025-04-03 | End: 2025-04-05 | Stop reason: HOSPADM

## 2025-04-03 RX ORDER — OLANZAPINE 5 MG/1
5 TABLET ORAL EVERY 12 HOURS PRN
Status: DISCONTINUED | OUTPATIENT
Start: 2025-04-03 | End: 2025-04-05 | Stop reason: HOSPADM

## 2025-04-03 RX ORDER — ATORVASTATIN CALCIUM 20 MG/1
20 TABLET, FILM COATED ORAL DAILY
COMMUNITY

## 2025-04-03 RX ORDER — HYDRALAZINE HYDROCHLORIDE 25 MG/1
25 TABLET, FILM COATED ORAL ONCE
Status: COMPLETED | OUTPATIENT
Start: 2025-04-03 | End: 2025-04-03

## 2025-04-03 RX ADMIN — POTASSIUM CHLORIDE 10 MEQ: 7.46 INJECTION, SOLUTION INTRAVENOUS at 02:51

## 2025-04-03 RX ADMIN — Medication 10 MG: at 21:13

## 2025-04-03 RX ADMIN — RISPERIDONE 0.5 MG: 0.25 TABLET, FILM COATED ORAL at 21:13

## 2025-04-03 RX ADMIN — TAMSULOSIN HYDROCHLORIDE 0.4 MG: 0.4 CAPSULE ORAL at 10:28

## 2025-04-03 RX ADMIN — RISPERIDONE 0.5 MG: 0.25 TABLET, FILM COATED ORAL at 10:27

## 2025-04-03 RX ADMIN — POTASSIUM CHLORIDE 10 MEQ: 750 TABLET, EXTENDED RELEASE ORAL at 10:28

## 2025-04-03 RX ADMIN — Medication 10 ML: at 10:38

## 2025-04-03 RX ADMIN — HYDRALAZINE HYDROCHLORIDE 25 MG: 25 TABLET ORAL at 17:40

## 2025-04-03 RX ADMIN — POTASSIUM CHLORIDE 10 MEQ: 7.46 INJECTION, SOLUTION INTRAVENOUS at 00:02

## 2025-04-03 RX ADMIN — MAGNESIUM SULFATE HEPTAHYDRATE 2000 MG: 40 INJECTION, SOLUTION INTRAVENOUS at 00:06

## 2025-04-03 RX ADMIN — FUROSEMIDE 20 MG: 20 TABLET ORAL at 10:28

## 2025-04-03 RX ADMIN — POTASSIUM CHLORIDE 10 MEQ: 7.46 INJECTION, SOLUTION INTRAVENOUS at 01:20

## 2025-04-03 RX ADMIN — MIRTAZAPINE 15 MG: 15 TABLET, FILM COATED ORAL at 21:13

## 2025-04-03 RX ADMIN — Medication 10 ML: at 21:19

## 2025-04-03 RX ADMIN — AMLODIPINE BESYLATE 5 MG: 5 TABLET ORAL at 17:40

## 2025-04-03 RX ADMIN — ATORVASTATIN CALCIUM 20 MG: 10 TABLET, FILM COATED ORAL at 10:27

## 2025-04-03 ASSESSMENT — PAIN SCALES - WONG BAKER: WONGBAKER_NUMERICALRESPONSE: HURTS A LITTLE BIT

## 2025-04-03 NOTE — PROGRESS NOTES
Hospital Medicine Progress Note      Subjective:  No further fevers.  He has no localizing symptoms.  He couldn't cooperate with the TTE attempt.        General appearance: No apparent distress, appears stated age.  HEENT: Pupils equal, round.  Conjunctivae/corneas clear.  Neck: No jugular venous distention.   Respiratory:  Normal respiratory effort.  Bilaterally without Rales/Wheezes/Rhonchi.  Not coughing.   Cardiovascular: Normal rate and regular rhythm with normal S1/S2 without murmurs, rubs or gallops.  Abdomen: Soft, non-tender, non-distended with normal bowel sounds.  Musculoskeletal: No edema.  Without deformity.  Muscle wasting.   Skin: No jaundice.  Birthmark R medial thigh.  Neurologic:  Neurovascularly intact without any focal sensory/motor deficits. Cranial nerves: II-XII intact.  Tremulous.   Psychiatric: Alert and disoriented, does not have insight, restless, confused.       Presenting Admission History:  \"Zaid Ortega is a/an 80 y.o. male with a significant past medical history of Alzheimer's disease, hyperlipidemia, chronic anemia, and MGUS who presents to [facility] a direct admit from [facility] ED after he was presented there from [facility] via EMS for staff concerns there for fever.\"  He had a 100.3 temp at the [facility] ED.  Because of a troponin in the 40's and questionable RVR the patient was transferred to [facility] overnight.       Assessment/Plan:        100.3 temp.  No convincing evidence of any particular infection, maybe he had some transient viral syndrome.  No localizing symptoms or exam findings.  Unremarkable labs and imaging.  F/u urine culture per cardiology.    Afib ruled out per cardiology.    Troponin elevation treated conservatively per cardiology, ACS ruled out.  Patient could not tolerate TTE.    HTN.  Started amlodipine after review of outpatient records.     HLD.  Statin.     Obstructive uropathy.  Tamsulosin.     Alzheimer's dementia with behavioral disturbances.  Risperidone.  04/02/25 2156 04/03/25  0417     --  139   K 3.3* 3.1* 3.4*     --  102   CO2 28  --  29   BUN 22*  --  18   CREATININE 1.0  --  0.8   CALCIUM 9.1  --  9.1   MG 1.60* 1.74* 2.23   PHOS  --  3.0  --      Recent Labs     04/02/25  1521 04/02/25  1611 04/02/25  2156   TROPHS 45* 46* 52*     No results for input(s): \"LABA1C\" in the last 72 hours.  Recent Labs     04/02/25  1521   AST 23   ALT 14   BILITOT 0.5   ALKPHOS 83     Recent Labs     04/02/25  1509 04/02/25  2156   LACTA 1.0  --    TSH  --  3.35       Urine Cultures: No results found for: \"LABURIN\"  Blood Cultures: No results found for: \"BC\"  No results found for: \"BLOODCULT2\"  Organism: No results found for: \"ORG\"      JET WALTER MD        V 10.25    Date of Admission: 4/2/2025  Hospital Day: 2      No chief complaint on file.        Current Principal Problem:  Irregular heart rhythm      Consults:      IP CONSULT TO CARDIOLOGY

## 2025-04-03 NOTE — PROGRESS NOTES
Comprehensive Nutrition Assessment    Type and Reason for Visit:  Initial, Positive nutrition screen    Nutrition Recommendations/Plan:   Modify diet to JANKI diet.  Add Ensure BID.  Encourage po intakes.  Monitor po intakes, nutrition adequacy, weights, pertinent labs, BMs      Malnutrition Assessment:  Malnutrition Status:  At risk for malnutrition (04/03/25 1420)    Context:  Acute Illness     Findings of the 6 clinical characteristics of malnutrition:  Energy Intake:  Mild decrease in energy intake  Weight Loss:  Unable to assess (Stated CBW)     Body Fat Loss:  Unable to assess     Muscle Mass Loss:  Unable to assess    Fluid Accumulation:  Unable to assess     Strength:  Not Performed    Nutrition Assessment:    Positive nutrition screen pt for wt loss and poor appetite. Pt has a PMH of dementia, HLD and admitted with irregular heart rhythm. Attempted to visit pt x 2, pt busy with other interdisciplinary teams at both attempts. Pt has 1 po intake documented of 1 - 25% per EMR. Unable to verify wt loss d/t pt's CBW being a stated wt. Ordered new wt per RD. Will add ONS to pt's diet d/t poor po intake. Will continue to monitor while inpatient.    Nutrition Related Findings:    K 3.4. Last BM: 4/3. +1 nonpitting RLE edema. +2 pitting LLE edema. Wound Type: None       Current Nutrition Intake & Therapies:    Average Meal Intake: 1-25% (1 po intake documented)  Average Supplements Intake: None Ordered  ADULT DIET; Regular; No Added Salt (3-4 gm)  ADULT ORAL NUTRITION SUPPLEMENT; Breakfast, Dinner; Standard High Calorie/High Protein Oral Supplement    Anthropometric Measures:  Height: 177.8 cm (5' 10\")  Ideal Body Weight (IBW): 166 lbs (75 kg)       Current Body Weight: 79.4 kg (175 lb), 105.4 % IBW. Weight Source: Stated  Current BMI (kg/m2): 25.1           Weight Adjustment For: No Adjustment                 BMI Categories: Overweight (BMI 25.0-29.9)    Estimated Daily Nutrient Needs:  Energy Requirements Based

## 2025-04-03 NOTE — H&P
Hospital Medicine History & Physical        Date of Service: 04/02/2025    Time of Service: 2120    Disposition:    [x]Admitted to inpatient status with expected LOS greater than two midnights due to medical therapy.  []Placed in observation status.    Historian: Information was obtained from patient (limited), ED documentation, and use of Epic's chart review and Care Everywhere tabs    Chief Admission Complaint:  [facility], concern for new onset AF    Presenting Admission History:      Zaid Ortega is a/an 80 y.o. male with a significant past medical history of Alzheimer's disease, hyperlipidemia, chronic anemia, and MGUS who presents to [facility] a direct admit [facility] after he was presented there from [facility] via EMS for staff concerns there for fever.  Upon arrival to the ED there, he was afebrile and at mental baseline.  Staff had reported to EMS that \"fluid was going around.\"  His evaluation at [facility] included laboratory studies, EKG, and chest x-ray.  Chest x-ray was negative for any acute findings.  EKG showed sinus rhythm with aberrancy, there was question of atrial fibrillation but possible RVR as he was tachycardic at 1 point.  He remained afebrile with a Tmax of 100.3, did receive 1 dose of Tylenol.  Laboratory studies were performed, significant for potassium 3.3, creatinine 1.0, magnesium 1.60, troponin 45 with repeat of 46, and hemoglobin 11.4.  Flu and COVID testing were negative.  Urinalysis showed slightly cloudy urine, trace ketones, large blood, 30 protein, positive nitrites and negative leukocyte esterase; microscopy showed 0-2 casts, 0-2 urinary WBCs, greater than 100 urinary RBCs and 1+ bacteria.  Additionally, patient received a liter bolus of normal saline, a total of 20 mill equivalents of IV potassium.  Blood cultures are pending.  ED physician discussed the case with cardiology, who recommended patient be transferred here for further evaluation.  Hospital team  XRAY VIEW OF THE CHEST 4/2/2025 3:21 pm COMPARISON: Chest x-ray dated 02/09/2025 HISTORY: ORDERING SYSTEM PROVIDED HISTORY: irregular HR TECHNOLOGIST PROVIDED HISTORY: Reason for exam:->irregular HR Reason for Exam: irregular HR FINDINGS: Medical devices: None. Mediastinum/Heart: The cardiomediastinal silhouette is within limits.  The heart appears normal in size. Lungs: Lung volumes are low.  No lobar consolidation. Pleura: No findings to suggest pneumothorax or large pleural effusion. Bones/Soft tissues: Nothing acute.     Negative portable chest.       PCP: Galina Rasmussen APRN - CNP    Past Medical History:        Diagnosis Date    Dementia (HCC)     Hyperlipidemia     Iron (Fe) deficiency anemia     MGUS (monoclonal gammopathy of unknown significance)        Past Surgical History:        Procedure Laterality Date    BACK SURGERY      CHOLECYSTECTOMY      HERNIA REPAIR         Medications Prior to Admission:   Prior to Admission medications    Medication Sig Start Date End Date Taking? Authorizing Provider   furosemide (LASIX) 20 MG tablet Take 1 tablet by mouth daily    Darell Tomlinson MD   potassium chloride (MICRO-K) 10 MEQ extended release capsule Take 1 capsule by mouth daily    Darell Tomlinson MD   ondansetron (ZOFRAN-ODT) 4 MG disintegrating tablet Take 1 tablet by mouth every 8 hours as needed for Nausea or Vomiting    Darell Tomlinson MD   loperamide (IMODIUM) 2 MG capsule Take 1 capsule by mouth every 8 hours as needed for Diarrhea    Darell Tomlinson MD   mirtazapine (REMERON) 15 MG tablet Take 1 tablet by mouth nightly 2/16/25   Armando Evans DO   risperiDONE (RISPERDAL) 0.5 MG tablet Take 1 tablet by mouth 2 times daily 2/16/25   Armando Evans DO   OLANZapine (ZYPREXA) 2.5 MG tablet Take 1 tablet by mouth 3 times daily as needed (agitation)  Patient not taking: Reported on 4/2/2025 2/16/25   Armando Evans DO   melatonin 5 MG TBDP disintegrating tablet Take

## 2025-04-03 NOTE — PROGRESS NOTES
Patient admitted to room 206 from [facility].  Patient oriented to room, call light, bed rails, phone, lights and bathroom.  Patient instructed about the schedule of the day including: vital sign frequency, lab draws, possible tests, frequency of MD and staff rounds, including RN/MD rounding together at bedside, daily weights, and I &O's.  Patient instructed about prescribed diet, how to use 8MENU, and television.   bed alarm in place, patient aware of placement and reason.   Telemetry box  in place, patient aware of placement and reason.  Bed locked, in lowest position, side rails up 2/4, call light within reach.      ERPLYs camera in place for safety. Wife aware.

## 2025-04-03 NOTE — CARE COORDINATION
Case Management Assessment  Initial Evaluation    Date/Time of Evaluation: 4/3/2025 1:12 PM  Assessment Completed by: Hetal Cunningham RN    If patient is discharged prior to next notation, then this note serves as note for discharge by case management.    Patient Name: Zaid Ortega                   YOB: 1945  Diagnosis: New onset a-fib (HCC) [I48.91]  Irregular heart rhythm [I49.9]                   Date / Time: 4/2/2025  8:24 PM    Patient Admission Status: Inpatient   Readmission Risk (Low < 19, Mod (19-27), High > 27): Readmission Risk Score: 18.8    Current PCP: Galina Rasmussen APRN - CNP  PCP verified by CM? Yes    Chart Reviewed: Yes      History Provided by: Spouse  Patient Orientation: Other (see comment)    Patient Cognition: Long Term Memory Deficit    Hospitalization in the last 30 days (Readmission):  No    If yes, Readmission Assessment in CM Navigator will be completed.    Advance Directives:      Code Status: Limited   Patient's Primary Decision Maker is: Named in Scanned ACP Document    Primary Decision Maker: CammieCari - Spouse - [phone]    Secondary Decision Maker: cammiedarcie - Child - [phone]    Discharge Planning:    Patient lives with: Alone Type of Home: Assisted living  Primary Care Giver: Private caregiver  Patient Support Systems include: Spouse/Significant Other, Family Members   Current Financial resources: Medicare  Current community resources: Assisted Living  Current services prior to admission: Durable Medical Equipment, Extended Care Facility            Current DME:              Type of Home Care services:  Aide Services    ADLS  Prior functional level: Assistance with the following:, Bathing, Dressing, Toileting, Feeding, Mobility  Current functional level: Assistance with the following:, Bathing, Dressing, Toileting, Feeding, Mobility    PT AM-PAC:   /24  OT AM-PAC:   /24    Family can provide assistance at DC: No  Would you like Case Management to  discuss the discharge plan with any other family members/significant others, and if so, who? Yes (wife)  Plans to Return to Present Housing: Yes  Potential Assistance needed at discharge: Outpatient PT/OT, Skilled Nursing Facility            Potential DME:    Patient expects to discharge to: Assisted living  Plan for transportation at discharge:      Financial    Payor: [employer] MEDICARE / Plan: [employer] MEDICARE-ADVANTAGE PPO / Product Type: Medicare /     Does insurance require precert for SNF: Yes    Potential assistance Purchasing Medications: No  Meds-to-Beds request:        [id number] - [place], [place] - [address] - P [phone] - F [phone]  [address] [place] [address]  Phone: [phone] Fax: [phone]      Notes:    Factors facilitating achievement of predicted outcomes: Family support and Caregiver support      Additional Case Management Notes: Spoke with patient's wife via phone (patient with advanced dementia and sleeping).  Patient lives at [facility] assisted living.   Aides assist patient with all ADL's.   Wife states that the plan will be to get patient back to [facility] at discharge and patient will need an ambulance to return.   Will continue to follow.       IF APPLICABLE: The Patient and/or patient representative Zaid and his family were provided with a choice of provider and agrees with the discharge plan. Freedom of choice list with basic dialogue that supports the patient's individualized plan of care/goals and shares the quality data associated with the providers was provided to:     Patient Representative Name:       The Patient and/or Patient Representative Agree with the Discharge Plan?      Hetal Cunningham RN  Case Management Department  Ph: [phone]

## 2025-04-03 NOTE — PLAN OF CARE
Problem: Discharge Planning  Goal: Discharge to home or other facility with appropriate resources  Outcome: Progressing     Problem: Skin/Tissue Integrity  Goal: Skin integrity remains intact  Description: 1.  Monitor for areas of redness and/or skin breakdown  2.  Assess vascular access sites hourly  3.  Every 4-6 hours minimum:  Change oxygen saturation probe site  4.  Every 4-6 hours:  If on nasal continuous positive airway pressure, respiratory therapy assess nares and determine need for appliance change or resting period  Outcome: Progressing     Problem: Safety - Adult  Goal: Free from fall injury  Outcome: Progressing     Problem: Chronic Conditions and Co-morbidities  Goal: Patient's chronic conditions and co-morbidity symptoms are monitored and maintained or improved  Outcome: Progressing     Problem: Pain  Goal: Verbalizes/displays adequate comfort level or baseline comfort level  Outcome: Progressing     Problem: Nutrition Deficit:  Goal: Optimize nutritional status  Outcome: Progressing

## 2025-04-03 NOTE — PROGRESS NOTES
4 Eyes Skin Assessment     NAME:  Zaid Ortega  YOB: 1945  MEDICAL RECORD NUMBER:  [id number]    The patient is being assessed for  Admission    I agree that at least one RN has performed a thorough Head to Toe Skin Assessment on the patient. ALL assessment sites listed below have been assessed.      Areas assessed by both nurses:    Head, Face, Ears, Shoulders, Back, Chest, Arms, Elbows, Hands, Sacrum. Buttock, Coccyx, Ischium, Legs. Feet and Heels, and Under Medical Devices         Does the Patient have a Wound? No noted wound(s)       Parrish Prevention initiated by RN: Yes  Wound Care Orders initiated by RN: No    Pressure Injury (Stage 3,4, Unstageable, DTI, NWPT, and Complex wounds) if present, place Wound referral order by RN under : No    New Ostomies, if present place, Ostomy referral order under : No     Nurse 1 eSignature: Electronically signed by Bambi Ricks RN on 4/2/25 at 9:51 PM EDT    **SHARE this note so that the co-signing nurse can place an eSignature**    Nurse 2 eSignature: Electronically signed by Pili Hernández RN on 4/2/25 at 10:10 PM EDT

## 2025-04-03 NOTE — CONSULTS
CARDIOLOGY CONSULTATION        Patient Name: Zaid Ortega  Date of admission: 4/2/2025  8:24 PM  Admission Dx: New onset a-fib (HCC) [I48.91]  Irregular heart rhythm [I49.9]  Requesting Physician: Yuval Mei MD  Primary Care physician: Galina Rasmussen APRN - CNP    Reason for Consultation/Chief Complaint: Possible Atrial fibrillation     History of Present Illness:     Zaid Ortega is a 80 y.o. patient with a prior medical history notable for Alzheimer's disease, hyperlipidemia, chronic anemia, MGUS who presented to the hospital with complaints of fever from outside facility.    ED course/Vital signs on presentation: Noted to be with altered mentation from baseline.  Tmax 100.3 Fahrenheit and did receive Tylenol.  Potassium 3.3, creatinine 1.0, magnesium 1.6, troponin 45/46.  Flu and COVID testing were negative.  Urinalysis with large blood and positive nitrites, negative leuk esterase.  1+ bacteriuria.  Patient received fluids.  From a cardiac standpoint, he was felt to be irregular on arrival by exam.  Initial EKG showed sinus rhythm with frequent premature atrial contractions with and without aberrant conduction, occurring in a pattern of atrial bigeminy, voltage criteria for LVH.  Repeat showed sinus rhythm with PACs, short [place] interval.  Telemetry I show no atrial fibrillation overnight.    Patient valuated today.  He remains lethargic and confused at baseline.  He is able to give date of birth and full name but is not oriented to location and time.  He essentially denies cardiac review of systems.  At the present time he is having no chest pain/pressure/heaviness, shortness of breath, palpitations, dizziness, or syncope.  He does note slight cough with lying down last night.  Nonproductive.  In speaking with his wife by phone today, he was recently started on Lasix for worsening lower extremity edema.  He suffered about a fall per day at [facility] as they have a difficult time keeping him in the  wheelchair or bed and he gets up without staff.  No other new medications.  On infectious review of systems he denies feeling febrile, sputum production, dysuria/hematuria, abdominal pain, new rash or nausea/diarrhea.      Past Medical History:   has a past medical history of Dementia (HCC), Hyperlipidemia, Iron (Fe) deficiency anemia, and MGUS (monoclonal gammopathy of unknown significance).    Surgical History:   has a past surgical history that includes back surgery; Cholecystectomy; and hernia repair.     Social History:   reports that he has never smoked. He does not have any smokeless tobacco history on file. He reports that he does not drink alcohol and does not use drugs.     Family History:  Reports no history of early onset coronary artery disease, myocardial infarction, cerebrovascular accident or sudden cardiac death among primary relatives.        Home Medications:  Were reviewed and are listed in nursing record and/or below  Prior to Admission medications    Medication Sig Start Date End Date Taking? Authorizing Provider   furosemide (LASIX) 20 MG tablet Take 1 tablet by mouth daily    Darell Tomlinson MD   potassium chloride (MICRO-K) 10 MEQ extended release capsule Take 1 capsule by mouth daily    Darell Tomlinson MD   ondansetron (ZOFRAN-ODT) 4 MG disintegrating tablet Take 1 tablet by mouth every 8 hours as needed for Nausea or Vomiting    Darell Tomlnison MD   loperamide (IMODIUM) 2 MG capsule Take 1 capsule by mouth every 8 hours as needed for Diarrhea    ProviderDarell MD   mirtazapine (REMERON) 15 MG tablet Take 1 tablet by mouth nightly 2/16/25   Armando Evans DO   risperiDONE (RISPERDAL) 0.5 MG tablet Take 1 tablet by mouth 2 times daily 2/16/25   Armando Evans DO   OLANZapine (ZYPREXA) 2.5 MG tablet Take 1 tablet by mouth 3 times daily as needed (agitation)  Patient not taking: Reported on 4/2/2025 2/16/25   Armando Evans DO   melatonin 5 MG TBDP

## 2025-04-04 LAB
ALBUMIN SERPL-MCNC: 3.2 G/DL (ref 3.4–5)
ALP SERPL-CCNC: 73 U/L (ref 40–129)
ALT SERPL-CCNC: 17 U/L (ref 10–40)
ANION GAP SERPL CALCULATED.3IONS-SCNC: 9 MMOL/L (ref 3–16)
AST SERPL-CCNC: 25 U/L (ref 15–37)
BACTERIA UR CULT: ABNORMAL
BACTERIA UR CULT: ABNORMAL
BASOPHILS # BLD: 0 K/UL (ref 0–0.2)
BASOPHILS NFR BLD: 0.5 %
BILIRUB DIRECT SERPL-MCNC: 0.2 MG/DL (ref 0–0.3)
BILIRUB INDIRECT SERPL-MCNC: 0.1 MG/DL (ref 0–1)
BILIRUB SERPL-MCNC: 0.3 MG/DL (ref 0–1)
BUN SERPL-MCNC: 16 MG/DL (ref 7–20)
CALCIUM SERPL-MCNC: 9.5 MG/DL (ref 8.3–10.6)
CHLORIDE SERPL-SCNC: 103 MMOL/L (ref 99–110)
CO2 SERPL-SCNC: 28 MMOL/L (ref 21–32)
CREAT SERPL-MCNC: 0.7 MG/DL (ref 0.8–1.3)
DEPRECATED RDW RBC AUTO: 13.5 % (ref 12.4–15.4)
EKG DIAGNOSIS: NORMAL
EKG Q-T INTERVAL: 392 MS
EKG QRS DURATION: 80 MS
EKG QTC CALCULATION (BAZETT): 401 MS
EKG R AXIS: -19 DEGREES
EKG T AXIS: -10 DEGREES
EKG VENTRICULAR RATE: 63 BPM
EOSINOPHIL # BLD: 0.3 K/UL (ref 0–0.6)
EOSINOPHIL NFR BLD: 4.4 %
GFR SERPLBLD CREATININE-BSD FMLA CKD-EPI: >90 ML/MIN/{1.73_M2}
GLUCOSE SERPL-MCNC: 95 MG/DL (ref 70–99)
HCT VFR BLD AUTO: 31.6 % (ref 40.5–52.5)
HGB BLD-MCNC: 10.7 G/DL (ref 13.5–17.5)
LYMPHOCYTES # BLD: 1.3 K/UL (ref 1–5.1)
LYMPHOCYTES NFR BLD: 21.3 %
MAGNESIUM SERPL-MCNC: 1.93 MG/DL (ref 1.8–2.4)
MCH RBC QN AUTO: 31 PG (ref 26–34)
MCHC RBC AUTO-ENTMCNC: 33.9 G/DL (ref 31–36)
MCV RBC AUTO: 91.4 FL (ref 80–100)
MONOCYTES # BLD: 0.6 K/UL (ref 0–1.3)
MONOCYTES NFR BLD: 9 %
NEUTROPHILS # BLD: 4.1 K/UL (ref 1.7–7.7)
NEUTROPHILS NFR BLD: 64.8 %
ORGANISM: ABNORMAL
PLATELET # BLD AUTO: 365 K/UL (ref 135–450)
PMV BLD AUTO: 7.2 FL (ref 5–10.5)
POTASSIUM SERPL-SCNC: 3.3 MMOL/L (ref 3.5–5.1)
PROT SERPL-MCNC: 6 G/DL (ref 6.4–8.2)
RBC # BLD AUTO: 3.46 M/UL (ref 4.2–5.9)
SODIUM SERPL-SCNC: 140 MMOL/L (ref 136–145)
WBC # BLD AUTO: 6.3 K/UL (ref 4–11)

## 2025-04-04 PROCEDURE — 93010 ELECTROCARDIOGRAM REPORT: CPT | Performed by: INTERNAL MEDICINE

## 2025-04-04 PROCEDURE — 99232 SBSQ HOSP IP/OBS MODERATE 35: CPT | Performed by: INTERNAL MEDICINE

## 2025-04-04 PROCEDURE — 80076 HEPATIC FUNCTION PANEL: CPT

## 2025-04-04 PROCEDURE — 6370000000 HC RX 637 (ALT 250 FOR IP): Performed by: NURSE PRACTITIONER

## 2025-04-04 PROCEDURE — 97530 THERAPEUTIC ACTIVITIES: CPT

## 2025-04-04 PROCEDURE — 85025 COMPLETE CBC W/AUTO DIFF WBC: CPT

## 2025-04-04 PROCEDURE — 6370000000 HC RX 637 (ALT 250 FOR IP): Performed by: INTERNAL MEDICINE

## 2025-04-04 PROCEDURE — 36415 COLL VENOUS BLD VENIPUNCTURE: CPT

## 2025-04-04 PROCEDURE — 97162 PT EVAL MOD COMPLEX 30 MIN: CPT

## 2025-04-04 PROCEDURE — 2060000000 HC ICU INTERMEDIATE R&B

## 2025-04-04 PROCEDURE — 97166 OT EVAL MOD COMPLEX 45 MIN: CPT

## 2025-04-04 PROCEDURE — 80048 BASIC METABOLIC PNL TOTAL CA: CPT

## 2025-04-04 PROCEDURE — 83735 ASSAY OF MAGNESIUM: CPT

## 2025-04-04 RX ORDER — AMLODIPINE BESYLATE 5 MG/1
5 TABLET ORAL DAILY
Qty: 30 TABLET | Refills: 3 | Status: SHIPPED | OUTPATIENT
Start: 2025-04-04

## 2025-04-04 RX ADMIN — ATORVASTATIN CALCIUM 20 MG: 10 TABLET, FILM COATED ORAL at 10:27

## 2025-04-04 RX ADMIN — TAMSULOSIN HYDROCHLORIDE 0.4 MG: 0.4 CAPSULE ORAL at 10:27

## 2025-04-04 RX ADMIN — Medication 10 MG: at 20:18

## 2025-04-04 RX ADMIN — POTASSIUM CHLORIDE 10 MEQ: 750 TABLET, EXTENDED RELEASE ORAL at 10:27

## 2025-04-04 RX ADMIN — RISPERIDONE 0.5 MG: 0.25 TABLET, FILM COATED ORAL at 20:18

## 2025-04-04 RX ADMIN — AMLODIPINE BESYLATE 5 MG: 5 TABLET ORAL at 10:27

## 2025-04-04 RX ADMIN — MIRTAZAPINE 15 MG: 15 TABLET, FILM COATED ORAL at 20:19

## 2025-04-04 RX ADMIN — RISPERIDONE 0.5 MG: 0.25 TABLET, FILM COATED ORAL at 10:27

## 2025-04-04 NOTE — PLAN OF CARE
Problem: Discharge Planning  Goal: Discharge to home or other facility with appropriate resources  4/4/2025 0036 by Tracie Will RN  Outcome: Progressing  4/3/2025 1610 by Keila Ortiz RN  Outcome: Progressing     Problem: Skin/Tissue Integrity  Goal: Skin integrity remains intact  Description: 1.  Monitor for areas of redness and/or skin breakdown  2.  Assess vascular access sites hourly  3.  Every 4-6 hours minimum:  Change oxygen saturation probe site  4.  Every 4-6 hours:  If on nasal continuous positive airway pressure, respiratory therapy assess nares and determine need for appliance change or resting period  4/4/2025 0036 by Tracie Will RN  Outcome: Progressing  4/3/2025 1610 by Keila Ortiz RN  Outcome: Progressing     Problem: Safety - Adult  Goal: Free from fall injury  4/4/2025 0036 by Tracie Will RN  Outcome: Progressing  4/3/2025 1610 by Keila Ortiz RN  Outcome: Progressing     Problem: Chronic Conditions and Co-morbidities  Goal: Patient's chronic conditions and co-morbidity symptoms are monitored and maintained or improved  4/4/2025 0036 by Tracie Will RN  Outcome: Progressing  4/3/2025 1610 by Keila Ortiz RN  Outcome: Progressing     Problem: Pain  Goal: Verbalizes/displays adequate comfort level or baseline comfort level  4/4/2025 0036 by Tracie Will RN  Outcome: Progressing  4/3/2025 1610 by Keila Ortiz RN  Outcome: Progressing

## 2025-04-04 NOTE — DISCHARGE INSTR - COC
Continuity of Care Form    Patient Name: Zaid Bonds   :  1945  MRN:  [id number]    Admit date:  2025  Discharge date:  25      Code Status Order: Limited   Advance Directives:     Admitting Physician:  Yuval Mei MD  PCP: Galina Rasmussen, APRN - CNP    Discharging Nurse: Juan Capellan RN  Discharging Hospital Unit/Room#: 0206/0206-01  Discharging Unit Phone Number: [phone]    Emergency Contact:   Extended Emergency Contact Information  Primary Emergency Contact: Cari Bonds  Home Phone: [phone]  Mobile Phone: [phone]  Relation: Spouse  Secondary Emergency Contact: darcie bonds  Home Phone: [phone]  Mobile Phone: [phone]  Relation: Child    Past Surgical History:  Past Surgical History:   Procedure Laterality Date    BACK SURGERY      CHOLECYSTECTOMY      HERNIA REPAIR         Immunization History:   Immunization History   Administered Date(s) Administered    COVID-19, PFIZER PURPLE top, DILUTE for use, (age 12 y+), 30mcg/0.3mL 2021, 2021, 10/22/2021       Active Problems:  Patient Active Problem List   Diagnosis Code    Altered mental state R41.82    Failure to thrive in adult R62.7    Dementia with behavioral disturbance (HCC) F03.918    Irregular heart rhythm I49.9       Isolation/Infection:   Isolation            No Isolation          Patient Infection Status    No active infections.   Resolved       Infection Onset Added Last Indicated Last Indicated By Resolved Resolved By    COVID-19 24 COVID-19 & Influenza Combo 24 Infection                          Nurse Assessment:  Last Vital Signs: /64   Pulse 71   Temp 97.9 °F (36.6 °C) (Oral)   Resp 16   Ht 1.778 m (5' 10\")   Wt 79.4 kg (175 lb)   SpO2 94%   BMI 25.11 kg/m²     Last documented pain score (0-10 scale):    Last Weight:   Wt Readings from Last 1 Encounters:   25 79.4 kg (175 lb)     Mental Status:  disoriented    IV Access:  -  Electronically signed by Citlaly Barrientos RN on 4/5/25 at 8:46 AM EDT    PHYSICIAN SECTION    Prognosis:     Condition at Discharge: Stable    Recommended Labs or Other Treatments After Discharge: PT/OT, nurse    Physician Certification: I certify the above information and transfer of Zaid Ortega  is necessary for the continuing treatment of the diagnosis listed and that he requires Home Care for less than 30 days    Update Admission H&P: No change in H&P    PHYSICIAN SIGNATURE:  Electronically signed by JET WALTER MD on 4/5/25 at 11:39 AM EDT

## 2025-04-04 NOTE — PROGRESS NOTES
Occupational Therapy  Facility/Department: [facility] A2 CARD TELEMETRY  Occupational Therapy Initial Assessment and Treatment    Name: Zaid Ortega  : 1945  MRN: [id number]  Date of Service: 2025    Discharge Recommendations:  Subacute/Skilled Nursing Facility, 24 hour supervision or assist, Home with Home health OT (pt from Assisted living per CM notes)  OT Equipment Recommendations  Equipment Needed: No     Therapy discharge recommendations are subject to collaboration from the patient’s interdisciplinary healthcare team, including MD and case management recommendations.    Barriers to Home Discharge:   [] Steps to access home entry or bed/bath:   [x] Unable to transfer, ambulate, or propel wheelchair household distances without assist   [x] Limited available assist at home upon discharge    [] Patient or family requests d/c to post-acute facility    [x] Poor cognition/safety awareness for d/c to home alone    [] Unable to maintain ordered weight bearing status    [x] Patient with salient signs of long-standing immobility   [x] Decreased independence with ADLs   [x] Increased risk for falls   [] Other:    If pt is unable to be seen after this session, please let this note serve as discharge summary.  Please see case management note for discharge disposition.  Thank you.    Patient Diagnosis(es): The encounter diagnosis was Elevated troponin.  Past Medical History:  has a past medical history of Dementia (HCC), Hyperlipidemia, Iron (Fe) deficiency anemia, and MGUS (monoclonal gammopathy of unknown significance).  Past Surgical History:  has a past surgical history that includes back surgery; Cholecystectomy; and hernia repair.           Assessment  Performance deficits / Impairments: Decreased endurance;Decreased functional mobility ;Decreased ADL status;Decreased posture;Decreased balance;Decreased strength;Decreased safe awareness;Decreased cognition    Assessment: Co-tx collaboration this date to safely  functional  Coordination: Generally decreased, functional  Tone: Normal  Sensation: Intact  ADL  UE Dressing: Maximum assistance  UE Dressing Skilled Clinical Factors: max A to thread BLE into gown  Putting On/Taking Off Footwear: Dependent/Total  Putting On/Taking Off Footwear Skilled Clinical Factors: don socks at bed level  Toileting: Dependent/Total  Toileting Skilled Clinical Factors: [place]  Functional Mobility: Unable to assess (Comment)  Functional Mobility Skilled Clinical Factors: pt unable to achieve full stance at EOB, requiring max Ax2 for partial stance at RW  Product Used : Bath wipes     Activity Tolerance  Activity Tolerance: Patient tolerated evaluation without incident;Patient tolerated treatment well;Treatment limited secondary to decreased cognition           Cognition  Overall Cognitive Status: Exceptions  Arousal/Alertness: Appropriate responses to stimuli  Following Commands: Inconsistently follows commands  Attention Span: Attends with cues to redirect  Memory: Decreased recall of precautions;Decreased recall of recent events;Decreased recall of biographical Information;Decreased long term memory;Decreased short term memory  Safety Judgement: Decreased awareness of need for assistance;Decreased awareness of need for safety  Problem Solving: Assistance required to correct errors made;Assistance required to generate solutions;Assistance required to identify errors made;Assistance required to implement solutions;Decreased awareness of errors  Insights: Not aware of deficits  Initiation: Requires cues for all  Sequencing: Requires cues for all  Orientation  Overall Orientation Status: Impaired  Orientation Level: Disoriented to person;Disoriented to place;Disoriented to time;Disoriented to situation (able to report name, unable to correctly report )                  Education Given To: Patient  Education Provided: Role of Therapy;Transfer Training;Plan of

## 2025-04-04 NOTE — PLAN OF CARE
Problem: Discharge Planning  Goal: Discharge to home or other facility with appropriate resources  4/4/2025 1202 by Radha Acharya RN  Outcome: Adequate for Discharge     Problem: Skin/Tissue Integrity  Goal: Skin integrity remains intact  Description: 1.  Monitor for areas of redness and/or skin breakdown  2.  Assess vascular access sites hourly  3.  Every 4-6 hours minimum:  Change oxygen saturation probe site  4.  Every 4-6 hours:  If on nasal continuous positive airway pressure, respiratory therapy assess nares and determine need for appliance change or resting period  4/4/2025 1202 by Radha Acharya RN  Outcome: Adequate for Discharge  4/4/2025 1100 by Radha Acharya RN  Outcome: Progressing     Problem: Safety - Adult  Goal: Free from fall injury  4/4/2025 1202 by Radha Acharya RN  Outcome: Adequate for Discharge  4/4/2025 1100 by Radha Acharya RN  Outcome: Progressing     Problem: Chronic Conditions and Co-morbidities  Goal: Patient's chronic conditions and co-morbidity symptoms are monitored and maintained or improved  4/4/2025 1202 by Radha Acharya RN  Outcome: Adequate for Discharge  4/4/2025 1100 by Radha Acharya RN  Outcome: Progressing     Problem: Pain  Goal: Verbalizes/displays adequate comfort level or baseline comfort level  4/4/2025 1202 by Radha Acharya RN  Outcome: Adequate for Discharge  4/4/2025 1100 by Radha Acharya RN  Outcome: Progressing     Problem: Nutrition Deficit:  Goal: Optimize nutritional status  4/4/2025 1202 by Radha Acharya RN  Outcome: Adequate for Discharge  4/4/2025 1100 by Radha Acharya RN  Outcome: Progressing

## 2025-04-04 NOTE — PROGRESS NOTES
CARDIOLOGY PROGRESS NOTE        Patient Name: Zaid Ortega  Date of admission: 4/2/2025  8:24 PM  Admission Dx: New onset a-fib (HCC) [I48.91]  Irregular heart rhythm [I49.9]  Requesting Physician: Yuval Mei MD  Primary Care physician: Galina Rasmussen APRN - CNP    Reason for Consultation/Chief Complaint: Possible Atrial fibrillation     History of Present Illness:     Zaid Ortega is a 80 y.o. patient with a prior medical history notable for Alzheimer's disease, hyperlipidemia, chronic anemia, MGUS who presented to the hospital with complaints of fever from outside facility.      Re-evaluated today. Notably more awake, responsive, remains AAO 2/4. Working with PT/OT today. No chest pain, shortness of breath, palpitations endorsed. No dizziness. No concerns on part of nursing. On review of notes, plan for DC today. No antibiotic as no localized infection found.     Past Medical History:   has a past medical history of Dementia (HCC), Hyperlipidemia, Iron (Fe) deficiency anemia, and MGUS (monoclonal gammopathy of unknown significance).    Surgical History:   has a past surgical history that includes back surgery; Cholecystectomy; and hernia repair.     Social History:   reports that he has never smoked. He does not have any smokeless tobacco history on file. He reports that he does not drink alcohol and does not use drugs.     Family History:  Reports no history of early onset coronary artery disease, myocardial infarction, cerebrovascular accident or sudden cardiac death among primary relatives.    Home Medications:  Were reviewed and are listed in nursing record and/or below  Prior to Admission medications    Medication Sig Start Date End Date Taking? Authorizing Provider   amLODIPine (NORVASC) 5 MG tablet Take 1 tablet by mouth daily 4/4/25  Yes Colby Bernabe MD   atorvastatin (LIPITOR) 20 MG tablet Take 1 tablet by mouth daily   Yes ProviderDarell MD   furosemide (LASIX) 20 MG tablet Take 1  [facility]  Cardiovascular Disease  [facility]  [phone] [place] Office  [phone] [place] Office  4/4/2025 12:02 PM

## 2025-04-04 NOTE — PROGRESS NOTES
Received a call from RN saying memory care unit need further evaluation to accept the patient  Vital stable and no new complaints  Patient had Avasys    Discharge was canceled and transfer to [facility]

## 2025-04-04 NOTE — PROGRESS NOTES
Received call from [facility], [facility] (would be accepting nurse) stated that patient \"was not allowed to return to facility, he would need to be re-evaluated\" Charge nurse notified  and informed of not being discharged to [facility]. Dr Hedirck notified via perfect serve. Patient with no IV, off tele, avasys in place at this time. Awaiting return call.

## 2025-04-04 NOTE — PROGRESS NOTES
Physical Therapy  Facility/Department: [facility] A2 CARD TELEMETRY  Physical Therapy Initial Assessment/Treatment     Name: Zaid Ortega  : 1945  MRN: [id number]  Date of Service: 2025    Discharge Recommendations:  Subacute/Skilled Nursing Facility, Continue to assess pending progress, Home with Home health PT, 24 hour supervision or assist (SVF vs assisted living with PT)   PT Equipment Recommendations  Equipment Needed: No      Patient Diagnosis(es): The encounter diagnosis was Elevated troponin.  Past Medical History:  has a past medical history of Dementia (HCC), Hyperlipidemia, Iron (Fe) deficiency anemia, and MGUS (monoclonal gammopathy of unknown significance).  Past Surgical History:  has a past surgical history that includes back surgery; Cholecystectomy; and hernia repair.    Assessment  Body Structures, Functions, Activity Limitations Requiring Skilled Therapeutic Intervention: Decreased functional mobility ;Decreased endurance;Decreased posture;Decreased balance;Decreased safe awareness;Decreased strength;Decreased ROM;Decreased cognition  Assessment: Pt to [facility] with diganosis of irregular heart rhythm. Per [facility] review [facility] is from [facility] alone and he gets assist for all ADL's from the staff. It is unkown if pt was ambulatory and/or transfering up to chair at baseline and if so how much assist was required. Pt currently requires mod Ax2 for supine>sit and total Ax2 for sit>supine. Pt sat EOB for a total of ~15 minutes with CGA initally progressing to SBA. Attempted 3 stands from the EOB with max Ax2, Able to get upright on the first attempt but unable to achieve full hip extension on 2nd and 3rd attempts. Pt will poor cognition and requries frequent cues for re-direction. Pt will continue to benefit from skilled PT services to address current deficts. SNF vs return to AL with PT pending the facilities ability to provide the level of physical assist needed.  Therapy Prognosis:  Inpatient CMS G-Code Modifier : CL    Goals  Short Term Goals  Time Frame for Short Term Goals: 4/11 (1 week)  Short Term Goal 1: Pt will perform supine<>sit with mod A  Short Term Goal 2: pt will perform sit <> stand with mod Ax2  Short Term Goal 3: pt will perform pivot transfer with mod Ax2  Short Term Goal 4: Pt will participate in 10-12 reps of BLE exercises by 4/07  Patient Goals   Patient Goals : unable to state due to impaired cognition       Education  Patient Education  Education Given To: Patient  Education Provided: Role of Therapy;Plan of Care;Transfer Training;Mobility Training;Equipment;Orientation  Education Provided Comments: Disease Specific Education: Pt educated on importance of OOB mobility, prevention of complications of bedrest, and general safety during hospitalization.  Education Method: Verbal  Barriers to Learning: Cognition  Education Outcome: Continued education needed;Unable to demonstrate understanding      Therapy Time   Individual Concurrent Group Co-treatment   Time In       1149   Time Out       1222   Minutes       33   Timed Code Treatment Minutes: 23 Minutes (10 min eval)       Tatianna Lyons, PT, DPT    If pt is unable to be seen after this session, please let this note serve as discharge summary.  Please see case management note for discharge disposition.  Thank you.

## 2025-04-04 NOTE — PROGRESS NOTES
Cari, pt wife notified of [facility] calling and not accepting patient back. Stated she was there and would find out what's going on.

## 2025-04-04 NOTE — PLAN OF CARE
Problem: Skin/Tissue Integrity  Goal: Skin integrity remains intact  Description: 1.  Monitor for areas of redness and/or skin breakdown  2.  Assess vascular access sites hourly  3.  Every 4-6 hours minimum:  Change oxygen saturation probe site  4.  Every 4-6 hours:  If on nasal continuous positive airway pressure, respiratory therapy assess nares and determine need for appliance change or resting period  4/4/2025 1100 by Radha Acharya RN  Outcome: Progressing     Problem: Safety - Adult  Goal: Free from fall injury  4/4/2025 1100 by Radha Acharya RN  Outcome: Progressing     Problem: Pain  Goal: Verbalizes/displays adequate comfort level or baseline comfort level  4/4/2025 1100 by Radha Acharya RN  Outcome: Progressing     Problem: Nutrition Deficit:  Goal: Optimize nutritional status  Outcome: Progressing     Problem: Chronic Conditions and Co-morbidities  Goal: Patient's chronic conditions and co-morbidity symptoms are monitored and maintained or improved  4/4/2025 1100 by Radha Acharya RN  Outcome: Progressing

## 2025-04-04 NOTE — DISCHARGE SUMMARY
PROGRESS NOTE - the patient was not discharged on this day because we were still working out some of the logistics of discharge.  Please see my note from the following day.     Name:  Zaid Ortega /Age/Sex: 1945 (80 y.o. male)   Admit Date: 2025  Discharge Date: ?   MRN & CSN:  [id number] & [id number] Encounter Date and Time 25 9:22 AM EDT    Attending:  Jet Bernabe MD Discharging Provider: JET BERNABE MD       Hospital Course:       \"Zaid Ortega is a/an 80 y.o. male with a significant past medical history of Alzheimer's disease, hyperlipidemia, chronic anemia, and MGUS who presents to [facility] a direct admit from [facility] after he was presented there from [facility] via EMS for staff concerns there for fever.\"  He had a 100.3 temp at the [facility].  Because of a troponin in the 40's and questionable RVR the patient was transferred to [facility] overnight.         100.3 temp at the other ED.  No convincing evidence of any particular infection, maybe he had some transient viral syndrome.  No localizing symptoms or exam findings.  Unremarkable labs and imaging.  OK for discharge.      Afib is a questionable diagnosis per cardiology.  Regardless, he is not an anticoagulation candidate.  Family agreed to avoid outpatient cardiac monitoring.     Troponin elevation treated conservatively per cardiology, ACS ruled out.  TTE with poor imaging but EF was normal without RWMA's.     HTN.  Started amlodipine after review of outpatient records.      HLD.  Statin.      Obstructive uropathy.  Tamsulosin.      Alzheimer's dementia with behavioral disturbances.  Risperidone.         Discharge Diagnosis:   Irregular heart rhythm      Discharge Instructions:     Follow up with PCP within 1-2 weeks.      Diet: ADULT DIET; Regular; No Added Salt (3-4 gm)  ADULT ORAL NUTRITION SUPPLEMENT; Breakfast, Dinner; Standard High Calorie/High Protein Oral Supplement  Activity: activity as tolerated  Discharged  for: \"ORG\"      The patient expressed appropriate understanding of, and agreement with the discharge recommendations, medications, and plan.     Limited    Discharge condition: stable    Consults this admission:  IP CONSULT TO CARDIOLOGY    Time Spent Discharging patient 33 minutes    Electronically signed by JET WALTER MD on 4/4/2025 at 9:24 AM

## 2025-04-04 NOTE — PROGRESS NOTES
Call back from Dr Hedrick, explained situation from facility. Order to discontinue discharge order, ok for no telemetry or IV and can be transferred to A1/med surg as pt is med stable.

## 2025-04-04 NOTE — CARE COORDINATION
Spoke with therapy who just worked with patient and are recommending SNF.  Placed call to wife to discuss therapy rec's but she is adamantly against this, stating that patient would do better in his regular environment at the memory care unit at [facility] assisted living.  Wife states that staff already assists patient with all ADLs and aides assist patient with transfers.  Wife states that patient is at baseline and \"rehab\" is not going to be helpful.   Placed call to Parla with [facility] to update her on the above.  She states they are able to accept patient back and informed CM to schedule ambulance transport to return.           CASE MANAGEMENT DISCHARGE SUMMARY      Discharge to: [facility] assisted living/memory care unit      IMM given: (date) 4/3/25      Transportation: ambulance    Medical Transport explained to pt/family. Pt/family voice no agency preference.    Agency used:   time:5:30pm    Ambulance form completed: Yes    Confirmed discharge plan with:patient/wife/RN/Lorna with [facility]     Patient: yes     Family:  yes      Facility/Agency, name:  [facility] faxed   Phone number for report to facility: [phone]     RN, name: Radha    Note: Discharging nurse to complete MATTIE, reconcile [facility], and place final copy with patient's discharge packet. RN to ensure that written prescriptions for  Level II medications are sent with patient to the facility as per protocol.

## 2025-04-05 VITALS
HEART RATE: 81 BPM | BODY MASS INDEX: 25.05 KG/M2 | SYSTOLIC BLOOD PRESSURE: 132 MMHG | DIASTOLIC BLOOD PRESSURE: 74 MMHG | TEMPERATURE: 98.4 F | OXYGEN SATURATION: 98 % | WEIGHT: 175 LBS | HEIGHT: 70 IN | RESPIRATION RATE: 18 BRPM

## 2025-04-05 PROCEDURE — 6370000000 HC RX 637 (ALT 250 FOR IP): Performed by: INTERNAL MEDICINE

## 2025-04-05 PROCEDURE — 6370000000 HC RX 637 (ALT 250 FOR IP): Performed by: NURSE PRACTITIONER

## 2025-04-05 RX ADMIN — RISPERIDONE 0.5 MG: 0.25 TABLET, FILM COATED ORAL at 10:51

## 2025-04-05 RX ADMIN — AMLODIPINE BESYLATE 5 MG: 5 TABLET ORAL at 10:51

## 2025-04-05 RX ADMIN — POTASSIUM CHLORIDE 10 MEQ: 750 TABLET, EXTENDED RELEASE ORAL at 10:51

## 2025-04-05 RX ADMIN — OLANZAPINE 5 MG: 5 TABLET, FILM COATED ORAL at 00:02

## 2025-04-05 RX ADMIN — ATORVASTATIN CALCIUM 20 MG: 10 TABLET, FILM COATED ORAL at 10:52

## 2025-04-05 RX ADMIN — TAMSULOSIN HYDROCHLORIDE 0.4 MG: 0.4 CAPSULE ORAL at 10:51

## 2025-04-05 ASSESSMENT — PAIN SCALES - GENERAL: PAINLEVEL_OUTOF10: 0

## 2025-04-05 NOTE — PLAN OF CARE
Problem: Discharge Planning  Goal: Discharge to home or other facility with appropriate resources  4/4/2025 1202 by Radha Acharya RN  Outcome: Adequate for Discharge     Problem: Skin/Tissue Integrity  Goal: Skin integrity remains intact  Description: 1.  Monitor for areas of redness and/or skin breakdown  2.  Assess vascular access sites hourly  3.  Every 4-6 hours minimum:  Change oxygen saturation probe site  4.  Every 4-6 hours:  If on nasal continuous positive airway pressure, respiratory therapy assess nares and determine need for appliance change or resting period  4/4/2025 1202 by Radha Acharya RN  Outcome: Adequate for Discharge  4/4/2025 1100 by Radha Acharya RN  Outcome: Progressing     Problem: Safety - Adult  Goal: Free from fall injury  4/4/2025 1202 by Radha Acharya RN  Outcome: Adequate for Discharge  4/4/2025 1100 by Radha Acharya RN  Outcome: Progressing     Problem: Safety - Adult  Goal: Free from fall injury  Outcome: Progressing     Problem: Chronic Conditions and Co-morbidities  Goal: Patient's chronic conditions and co-morbidity symptoms are monitored and maintained or improved  4/4/2025 1202 by Radha Acharya RN  Outcome: Adequate for Discharge  4/4/2025 1100 by Radha Acharya RN  Outcome: Progressing     Problem: Pain  Goal: Verbalizes/displays adequate comfort level or baseline comfort level  4/4/2025 1202 by Radha Acharya RN  Outcome: Adequate for Discharge  4/4/2025 1100 by Radha Acharya RN  Outcome: Progressing

## 2025-04-05 NOTE — PLAN OF CARE
Problem: Skin/Tissue Integrity - Adult  Goal: Skin integrity remains intact  Outcome: Adequate for Discharge  Goal: Incisions, wounds, or drain sites healing without S/S of infection  Outcome: Adequate for Discharge  Goal: Oral mucous membranes remain intact  Outcome: Adequate for Discharge     Problem: Musculoskeletal - Adult  Goal: Maintain proper alignment of affected body part  Outcome: Adequate for Discharge  Goal: Return ADL status to a safe level of function  Outcome: Adequate for Discharge     Problem: Gastrointestinal - Adult  Goal: Minimal or absence of nausea and vomiting  Outcome: Adequate for Discharge  Goal: Maintains or returns to baseline bowel function  Outcome: Adequate for Discharge  Goal: Maintains adequate nutritional intake  Outcome: Adequate for Discharge  Goal: Establish and maintain optimal ostomy function  Outcome: Adequate for Discharge     Problem: Genitourinary - Adult  Goal: Absence of urinary retention  Outcome: Adequate for Discharge  Goal: Urinary catheter remains patent  Outcome: Adequate for Discharge     Problem: Infection - Adult  Goal: Absence of infection at discharge  Outcome: Adequate for Discharge  Goal: Absence of infection during hospitalization  Outcome: Adequate for Discharge  Goal: Absence of fever/infection during anticipated neutropenic period  Outcome: Adequate for Discharge     Problem: Safety - Adult  Goal: Free from fall injury  Outcome: Adequate for Discharge

## 2025-04-05 NOTE — CARE COORDINATION
CASE MANAGEMENT DISCHARGE SUMMARY  Writer spoke with pt's daughter and wife, they had spoken with Jsoe at [facility] last evening and pt is able to go back there today.  Writer spoke with Juanjose, confirmed above.  Juanjose asked for [facility] order and [facility] to be faxed to her (fax ), and she will coordinate directly with [facility].    Discharge to: [facility] [place] [facility] with [facility] for PT, OT, nurse     IMM given: 4/3     Transportation:    Medical Transport explained to pt/family. Pt/family voice no agency preference.    Agency used: [facility]   time: 12:30p   Ambulance form completed: Yes    Confirmed discharge plan with:   Patient: no     Family:  yes, wife Cari     Facility/Agency, name:  [facility] faxed to Juanjose/DON at [facility] per her request   Phone number for report to facility: not needed     RN, name: BETHANY Felix-JAMA

## 2025-04-05 NOTE — PROGRESS NOTES
Patient discharged from facility to SNF. Report given to RN at [facility]. Patient left VIA EMS. Son at bedside during EMS arrival.

## 2025-04-05 NOTE — DISCHARGE SUMMARY
Discharge Summary    Name:  Zaid Ortega /Age/Sex: 1945 (80 y.o. male)   Admit Date: 2025  Discharge Date: 25    MRN & CSN:  [id number] & [id number] Encounter Date and Time 25 11:22 AM EDT    Attending:  Jet Bernabe MD Discharging Provider: JET BERNABE MD       Hospital Course:       \"Zaid Ortega is a/an 80 y.o. male with a significant past medical history of Alzheimer's disease, hyperlipidemia, chronic anemia, and MGUS who presents to [facility] a direct admit from [facility] after he was presented there from [facility] via EMS for staff concerns there for fever.\"  He had a 100.3 temp at the [facility].  Because of a troponin in the 40's and questionable RVR the patient was transferred to [facility] overnight.         100.3 temp at the other ED.  No convincing evidence of any particular infection, maybe he had some transient viral syndrome.  No localizing symptoms or exam findings.  Unremarkable labs and imaging.  OK for discharge.      Afib is a questionable diagnosis per cardiology.  Regardless, he is not an anticoagulation candidate.  Family agreed to avoid outpatient cardiac monitoring.     Troponin elevation treated conservatively per cardiology, ACS ruled out.  TTE with poor imaging but EF was normal without RWMA's.     HTN.  Started amlodipine after review of outpatient records.      HLD.  Statin.      Obstructive uropathy.  Tamsulosin.      Alzheimer's dementia with behavioral disturbances.  Risperidone.         Discharge Diagnosis:   Irregular heart rhythm      Discharge Instructions:     Follow up with PCP within 1-2 weeks.      Diet: ADULT DIET; Regular; No Added Salt (3-4 gm)  ADULT ORAL NUTRITION SUPPLEMENT; Breakfast, Dinner; Standard High Calorie/High Protein Oral Supplement  Activity: activity as tolerated  Discharged to:  back to [facility]  Condition on discharge: Stable    Objective Findings at Discharge:   /74   Pulse 81   Temp 98.4 °F (36.9 °C)   Resp  Results   Component Value Date/Time    ORG Gram negative ginny 04/02/2025 10:26 PM         The patient expressed appropriate understanding of, and agreement with the discharge recommendations, medications, and plan.     Limited    Discharge condition: stable    Consults this admission:  IP CONSULT TO CARDIOLOGY  IP CONSULT TO HOME CARE NEEDS    Time Spent Discharging patient 33 minutes    Electronically signed by JET WALTER MD on 4/5/2025 at 11:40 AM

## 2025-04-05 NOTE — PROGRESS NOTES
Physician Progress Note      PATIENT:               KATHRIN JOHN  CSN #:                  [id number]  :                       1945  ADMIT DATE:       2025 8:24 PM  DISCH DATE:  RESPONDING  PROVIDER #:        Mekhi Bernabe MD          QUERY TEXT:    Elevated cardiac troponin (cTc) levels are documented in the medical record   noted to be 45-52. Please clarify the cause:    The clinical indicators include:  Troponin 45-52  Per Cardiology consult note \"Flat trend.  In the setting of possible   infection.  Workup is ongoing for this.  EKG shows no ischemic changes.  No   ischemic signs or symptoms on limited history\". Per discharge summary   \"Troponin elevation treated conservatively per cardiology, ACS ruled out.  TTE   with poor imaging but EF was normal without RWMA's\".  Dementia  leg edema  HTN    Treated with EKG, Cardiology consult, serial labs, supportive care, ECHO  Options provided:  -- Myocardial injury, non-ischemic (non-traumatic) related to cardiac   arrythmia  -- Myocardial injury, non-ischemic (non-traumatic) related to possible viral   infection  -- Other - I will add my own diagnosis  -- Disagree - Not applicable / Not valid  -- Disagree - Clinically unable to determine / Unknown  -- Refer to Clinical Documentation Reviewer    PROVIDER RESPONSE TEXT:    Demand ischemia due to viral illness    Query created by: Ara Dick on 2025 10:27 AM      Electronically signed by:  Mekhi Bernabe MD 2025 12:05 PM

## 2025-04-05 NOTE — PROGRESS NOTES
Spoke to RN at [facility], facility was expecting patient admission last night at 0000. RN had questions about patient admitting diagnosis and whether pt would be coming tonight and if he was septic at point of admission. This RN clarified admitting diagnosis as well as reported that the patient will not be transferred currently due to pending evaluation from [facility]. Plan of care ongoing.

## 2025-04-05 NOTE — DISCHARGE INSTR - DIET

## 2025-04-06 ENCOUNTER — RESULTS FOLLOW-UP (OUTPATIENT)
Dept: EMERGENCY DEPT | Age: 80
End: 2025-04-06

## 2025-04-06 LAB
BACTERIA BLD CULT ORG #2: NORMAL
BACTERIA BLD CULT: NORMAL

## 2025-04-08 ENCOUNTER — APPOINTMENT (OUTPATIENT)
Dept: CT IMAGING | Age: 80
End: 2025-04-08
Payer: MEDICARE

## 2025-04-08 ENCOUNTER — HOSPITAL ENCOUNTER (EMERGENCY)
Age: 80
Discharge: OTHER INSTITUTION | End: 2025-04-09
Payer: MEDICARE

## 2025-04-08 ENCOUNTER — APPOINTMENT (OUTPATIENT)
Dept: GENERAL RADIOLOGY | Age: 80
End: 2025-04-08
Payer: MEDICARE
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
## 2025-04-08 PROCEDURE — 72131 CT LUMBAR SPINE W/O DYE: CPT

## 2025-04-08 PROCEDURE — 73521 X-RAY EXAM HIPS BI 2 VIEWS: CPT

## 2025-04-08 PROCEDURE — 70450 CT HEAD/BRAIN W/O DYE: CPT

## 2025-04-08 PROCEDURE — 72125 CT NECK SPINE W/O DYE: CPT

## 2025-04-08 PROCEDURE — 99284 EMERGENCY DEPT VISIT MOD MDM: CPT

## 2025-04-08 PROCEDURE — 72128 CT CHEST SPINE W/O DYE: CPT

## 2025-04-08 ASSESSMENT — LIFESTYLE VARIABLES
HOW MANY STANDARD DRINKS CONTAINING ALCOHOL DO YOU HAVE ON A TYPICAL DAY: PATIENT DOES NOT DRINK
HOW OFTEN DO YOU HAVE A DRINK CONTAINING ALCOHOL: NEVER

## 2025-04-09 VITALS
HEART RATE: 51 BPM | RESPIRATION RATE: 16 BRPM | SYSTOLIC BLOOD PRESSURE: 102 MMHG | TEMPERATURE: 97.6 F | DIASTOLIC BLOOD PRESSURE: 63 MMHG | OXYGEN SATURATION: 96 %

## 2025-04-09 NOTE — ED PROVIDER NOTES
[facility] EMERGENCY DEPARTMENT  Emergency Department Encounter    Patient Name: Zaid Ortega  MRN: [id number]  YOB: 1945  Date of Evaluation: 4/8/2025  Provider: Galina Rasmussen APRN - CNP  Note Started: 11:23 PM EDT 4/8/25    CHIEF COMPLAINT  Fall (Pt arrives via EMS from [facility]. Pt fell about 3ft off bed, c/o pain in head and hips. Pt unsure if he hit his head, denies blood thinners. A&O x 3.)    SHARED SERVICE VISIT  Evaluated by LIANG.  My supervising physician was available for consultation.     HISTORY OF PRESENT ILLNESS  Zaid Ortega is a 80 y.o. male who presents to the ED for evaluation of neck and hip pain status post fall.  Patient with history of dementia.  Comes from nursing home where had unwitnessed fall out of bed.  Son at bedside and reports that he is mentating at baseline.  Patient complaining of some neck and hip discomfort.  Transfers to wheelchair although not ambulatory.  Patient denies headache, lightheadedness, dizziness.  No chest pain or shortness of breath.  Denies fevers chills.  No numbness or tingling    No other complaints, modifying factors or associated symptoms.     Nursing notes reviewed were all reviewed and agreed with or any disagreements were addressed in the HPI.    PMH:  Past Medical History:   Diagnosis Date    Dementia (HCC)     Hyperlipidemia     Iron (Fe) deficiency anemia     MGUS (monoclonal gammopathy of unknown significance)      Surgical History:  Past Surgical History:   Procedure Laterality Date    BACK SURGERY      CHOLECYSTECTOMY      HERNIA REPAIR       Family History:  No family history on file.    Social History:  Social History     Socioeconomic History    Marital status:      Spouse name: Not on file    Number of children: Not on file    Years of education: Not on file    Highest education level: Not on file   Occupational History    Not on file   Tobacco Use    Smoking status: Never    Smokeless tobacco:

## 2025-04-09 NOTE — ED NOTES
Called [facility] to notify that pt is being transported back to facility and aware. Pt's son at pt's bedside was also aware of pt's tranfer back to facility via ambulance.